# Patient Record
Sex: MALE | Race: WHITE | Employment: FULL TIME | URBAN - METROPOLITAN AREA
[De-identification: names, ages, dates, MRNs, and addresses within clinical notes are randomized per-mention and may not be internally consistent; named-entity substitution may affect disease eponyms.]

---

## 2024-02-07 ENCOUNTER — OFFICE VISIT (OUTPATIENT)
Dept: ORTHOPEDIC SURGERY | Age: 49
End: 2024-02-07
Payer: COMMERCIAL

## 2024-02-07 VITALS — HEIGHT: 65 IN | BODY MASS INDEX: 27.49 KG/M2 | WEIGHT: 165 LBS

## 2024-02-07 DIAGNOSIS — S89.91XA INJURY OF RIGHT KNEE, INITIAL ENCOUNTER: Primary | ICD-10-CM

## 2024-02-07 PROCEDURE — 99203 OFFICE O/P NEW LOW 30 MIN: CPT | Performed by: ORTHOPAEDIC SURGERY

## 2024-02-07 NOTE — PROGRESS NOTES
Name: Best Garcia  YOB: 1975  Gender: male  MRN: [id number]    CC: Right knee injury    HPI:   02/03/2024: Right knee injury going from snow [place] a grass spot on a trail injuring right knee  02/07/2024: Initial visit: Right knee injury    ROS/Meds/PSH/PMH/FH/SH: reviewed today    Tobacco:  reports that he has never smoked. He has never used smokeless tobacco.     Physical Examination:  Patient appears to be alert and oriented with acceptable appearance.  No obvious distress or SOB  CV: appears to have acceptable vascular color and capillary refill  Neuro: appears to have mostly intact light touch sensation   Skin: Right = soft knee effusion  MS: Standing: Plantigrade: Gait slightly protected right  Right = medial/lateral femoral condyle area pain suggestive of MCL/LCL origin  Right = mild medial/lateral anterior knee pain  Right = no gross instability; full motion; 5/5 strength    XR: Right knee: Standing AP lateral notch and sunrise views taken today with no acute pathology appreciated; potential slight lateral patella tilting  XR Impression:  As above      Reviewed Test/Records/Documents:   11/02/2004: Dr. Tinoco reflects MRI scan discoid meniscus  11/08/2004: Dr. Tinoco: Right knee arthroscopy lateral discoid meniscus resection with chondroplasty    Injection: No indication for aspiration or injection    Assessment:    Right knee injury, knee effusion    Plan:   The patient and I discussed the above assessment. We explored treatment options.     Due to his prior knee discoid meniscus surgery, I will MRI scan to ensure no meniscal pathology  He has a large soft knee effusion with suspected MCL/LCL injuries and femoral condyle bone contusions    Advanced medical imaging: Right knee MRI scan: Assess knee injury for suspected MCL/LCL injuries with femoral bone contusions: Prior lateral discoid meniscectomy: Assess for any tearing of both meniscus and cruciate ligaments    DME: [employer] hinged brace:

## 2024-02-12 ENCOUNTER — TELEPHONE (OUTPATIENT)
Dept: ORTHOPEDIC SURGERY | Age: 49
End: 2024-02-12

## 2024-02-12 DIAGNOSIS — S89.91XA INJURY OF RIGHT KNEE, INITIAL ENCOUNTER: ICD-10-CM

## 2024-02-12 NOTE — TELEPHONE ENCOUNTER
LVM WITH CONSUELO (SPOUSE) to have Braydon call the office and move up his appointment. The primary number listed for Best has been disconnected. No MyCHart available to message him.

## 2024-02-15 ENCOUNTER — OFFICE VISIT (OUTPATIENT)
Dept: ORTHOPEDIC SURGERY | Age: 49
End: 2024-02-15
Payer: COMMERCIAL

## 2024-02-15 VITALS — BODY MASS INDEX: 27.49 KG/M2 | WEIGHT: 165 LBS | HEIGHT: 65 IN

## 2024-02-15 DIAGNOSIS — S83.281A ACUTE LATERAL MENISCUS TEAR OF RIGHT KNEE, INITIAL ENCOUNTER: ICD-10-CM

## 2024-02-15 DIAGNOSIS — S83.511A RUPTURE OF ANTERIOR CRUCIATE LIGAMENT OF RIGHT KNEE, INITIAL ENCOUNTER: Primary | ICD-10-CM

## 2024-02-15 PROCEDURE — 99204 OFFICE O/P NEW MOD 45 MIN: CPT | Performed by: ORTHOPAEDIC SURGERY

## 2024-02-15 NOTE — PROGRESS NOTES
meniscus tear.  We had a long discussion about treatment options including operative and nonoperative options.  He wants to stay very active including snow skiing and cycling and he works as a  with a lot of twisting and turning.  He is elected for operative intervention which I think is very reasonable.  We discussed ACL surgery and potential ACL repair given the proximal tear versus reconstruction with autograft and allograft options.  He is elected proceed with an ACL repair versus allograft if indicated.  We discussed lateral meniscus repair versus debridement and the postoperative course associated with both of those.  He wants to try to return to work as soon as possible.    We will see him back for preoperative appointment surgical plan to be for right knee arthroscopy ACL repair versus reconstruction with allograft and lateral meniscus repair versus debridement              Kash Veronica MD, FAAOS  Orthopaedics and Sports Medicine

## 2024-02-16 DIAGNOSIS — S83.511A RUPTURE OF ANTERIOR CRUCIATE LIGAMENT OF RIGHT KNEE, INITIAL ENCOUNTER: Primary | ICD-10-CM

## 2024-02-16 DIAGNOSIS — S83.281A ACUTE LATERAL MENISCUS TEAR OF RIGHT KNEE, INITIAL ENCOUNTER: ICD-10-CM

## 2024-02-16 DIAGNOSIS — S89.91XA INJURY OF RIGHT KNEE, INITIAL ENCOUNTER: ICD-10-CM

## 2024-02-22 DIAGNOSIS — S89.91XA INJURY OF RIGHT KNEE, INITIAL ENCOUNTER: ICD-10-CM

## 2024-02-22 DIAGNOSIS — S83.511A RUPTURE OF ANTERIOR CRUCIATE LIGAMENT OF RIGHT KNEE, INITIAL ENCOUNTER: Primary | ICD-10-CM

## 2024-02-22 DIAGNOSIS — S89.91XA INJURY OF KNEE, RIGHT, INITIAL ENCOUNTER: ICD-10-CM

## 2024-02-22 DIAGNOSIS — S83.281A ACUTE LATERAL MENISCUS TEAR OF RIGHT KNEE, INITIAL ENCOUNTER: ICD-10-CM

## 2024-02-23 PROBLEM — S89.91XA: Status: ACTIVE | Noted: 2024-02-22

## 2024-02-23 PROBLEM — S83.281A ACUTE LATERAL MENISCUS TEAR OF RIGHT KNEE: Status: ACTIVE | Noted: 2024-02-22

## 2024-03-01 NOTE — PROGRESS NOTES
Name: Best Garcia  YOB: 1975  Gender: male  MRN: [id number]    CC: Knee pain (R)      HPI: Best Garcia is a 49 y.o. male who presents with right knee pain. He is here today for a pre-op evaluation, He was given a TROM and an iceman today. He will go to [facility] for rehab following surgery.    No current outpatient medications on file.  No Known Allergies  No past medical history on file.  No past surgical history on file.  No family history on file.  Social History     Socioeconomic History    Marital status:      Spouse name: Not on file    Number of children: Not on file    Years of education: Not on file    Highest education level: Not on file   Occupational History    Not on file   Tobacco Use    Smoking status: Never    Smokeless tobacco: Never   Substance and Sexual Activity    Alcohol use: Not on file    Drug use: Not on file    Sexual activity: Not on file   Other Topics Concern    Not on file   Social History Narrative    Not on file     Social Determinants of Health     Financial Resource Strain: Not on file   Food Insecurity: Not on file   Transportation Needs: Not on file   Physical Activity: Not on file   Stress: Not on file   Social Connections: Not on file   Intimate Partner Violence: Not on file   Housing Stability: Not on file        Physical Examination:  General: no acute distress  Lungs: breathing easily  CV: regular rhythm by pulse  HEENT: Head is normocephalic and atraumatic without tenderness, visible or palpable masses, depressions or scarring.  Visual acuity intact.  Nasal mucosa is pink and moist. Pinna, tragus and ear canal are nontender and without swelling  ABD: soft and nontender to palpation. Normal bowel sound  Right Knee:   No significant change from previous exam. No effusion noted today. Positive grade 2B Lachman's positive anterior drawer. Tenderness palpation over the lateral joint line passive extension about 3 degrees flexion 140

## 2024-03-01 NOTE — H&P (VIEW-ONLY)
Name: Best Garcia  YOB: 1975  Gender: male  MRN: [id number]    CC: Knee pain (R)      HPI: Best Garcia is a 49 y.o. male who presents with right knee pain. He is here today for a pre-op evaluation, He was given a TROM and an iceman today. He will go to [facility] for rehab following surgery.    No current outpatient medications on file.  No Known Allergies  No past medical history on file.  No past surgical history on file.  No family history on file.  Social History     Socioeconomic History    Marital status:      Spouse name: Not on file    Number of children: Not on file    Years of education: Not on file    Highest education level: Not on file   Occupational History    Not on file   Tobacco Use    Smoking status: Never    Smokeless tobacco: Never   Substance and Sexual Activity    Alcohol use: Not on file    Drug use: Not on file    Sexual activity: Not on file   Other Topics Concern    Not on file   Social History Narrative    Not on file     Social Determinants of Health     Financial Resource Strain: Not on file   Food Insecurity: Not on file   Transportation Needs: Not on file   Physical Activity: Not on file   Stress: Not on file   Social Connections: Not on file   Intimate Partner Violence: Not on file   Housing Stability: Not on file        Physical Examination:  General: no acute distress  Lungs: breathing easily  CV: regular rhythm by pulse  HEENT: Head is normocephalic and atraumatic without tenderness, visible or palpable masses, depressions or scarring.  Visual acuity intact.  Nasal mucosa is pink and moist. Pinna, tragus and ear canal are nontender and without swelling  ABD: soft and nontender to palpation. Normal bowel sound  Right Knee:   No significant change from previous exam. No effusion noted today. Positive grade 2B Lachman's positive anterior drawer. Tenderness palpation over the lateral joint line passive extension about 3 degrees flexion 140

## 2024-03-04 ENCOUNTER — OFFICE VISIT (OUTPATIENT)
Dept: ORTHOPEDIC SURGERY | Age: 49
End: 2024-03-04

## 2024-03-04 DIAGNOSIS — S89.91XA INJURY OF RIGHT KNEE, INITIAL ENCOUNTER: ICD-10-CM

## 2024-03-04 DIAGNOSIS — S83.281A ACUTE LATERAL MENISCUS TEAR OF RIGHT KNEE, INITIAL ENCOUNTER: ICD-10-CM

## 2024-03-04 DIAGNOSIS — S83.511A RUPTURE OF ANTERIOR CRUCIATE LIGAMENT OF RIGHT KNEE, INITIAL ENCOUNTER: Primary | ICD-10-CM

## 2024-03-04 RX ORDER — OXYCODONE HYDROCHLORIDE 5 MG/1
5 TABLET ORAL EVERY 6 HOURS PRN
Qty: 30 TABLET | Refills: 0 | Status: SHIPPED | OUTPATIENT
Start: 2024-03-04 | End: 2024-03-09

## 2024-03-05 NOTE — PROGRESS NOTES
The brace was properly aligned medially and laterally along the length of the right Knee with the extension/flexion dials placed slightly above the patella (to insure that if brace slides down slightly the dials will still line up on either side of the patella/knee region). The brace is extended/shorten to the patient's leg length and to insure proper fit of the brace. The straps are tightened starting with the most distal strap and then strap proximal to the patella. The strap right below the patella is then secured and last is the strap highest on the quad. The straps are then trimmed for easier tightening of the brace for the patient.     Patient read and signed documenting they understand and agree to [facility]'s current DME return policy.

## 2024-03-07 NOTE — PERIOP NOTE
Patient verified name and .  Order for consent not found in EHR; patient verifies procedure.     Type 1B surgery, Phone assessment complete.  Orders not received.  Labs per surgeon: none  Labs per anesthesia protocol: none    Patient answered medical/surgical history questions at their best of ability. All prior to admission medications documented in EPIC.    Patient instructed to continue taking all prescription medications up to the day of surgery but to take only the following medications the day of surgery according to anesthesia guidelines with a small sip of water: Oxycodone as instructed if needed.   Patient informed that all vitamins and supplements should be held 7 days prior to surgery and NSAIDS 5 days prior to surgery. Prescription meds to hold:Vitamin C, Vitamin B12, Magnesium, Turmeric, and Vitamin D.    Patient instructed on the following:    > Arrive at OPC Entrance, time of arrival to be called the day before by 1700  > NPO after midnight, unless otherwise indicated, including gum, mints, and ice chips  > Responsible adult must drive patient to the hospital, stay during surgery, and patient will need supervision 24 hours after anesthesia  > Use non moisturizing soap in shower the night before surgery and on the morning of surgery  > All piercings must be removed prior to arrival.    > Leave all valuables (money and jewelry) at home but bring insurance card and ID on DOS.   > You may be required to pay a deductible or co-pay on the day of your procedure. You can pre-pay by calling [phone] if your surgery is at the [facility] or [phone] if your surgery is at the [facility].  > Do not wear make-up, nail polish, lotions, cologne, perfumes, powders, or oil on skin. Artificial nails are not permitted.

## 2024-03-12 NOTE — PERIOP NOTE
Preop department called to notify patient of arrival time for scheduled procedure. Instructions given to   - Arrive at OPC Entrance [address].  - Remain NPO after midnight, unless otherwise indicated, including gum, mints, and ice chips.   - Have a responsible adult to drive patient to the hospital, stay during surgery, and patient will need supervision 24 hours after anesthesia.   - Use antibacterial soap in shower the night before surgery and on the morning of surgery.       Was patient contacted: yes, pt  Voicemail left: n/a  Numbers contacted: [phone]   Arrival time: 0800

## 2024-03-13 ENCOUNTER — ANESTHESIA (OUTPATIENT)
Dept: SURGERY | Age: 49
End: 2024-03-13
Payer: COMMERCIAL

## 2024-03-13 ENCOUNTER — ANESTHESIA EVENT (OUTPATIENT)
Dept: SURGERY | Age: 49
End: 2024-03-13
Payer: COMMERCIAL

## 2024-03-13 ENCOUNTER — HOSPITAL ENCOUNTER (OUTPATIENT)
Age: 49
Setting detail: OUTPATIENT SURGERY
Discharge: HOME | End: 2024-03-13
Attending: ORTHOPAEDIC SURGERY | Admitting: ORTHOPAEDIC SURGERY
Payer: COMMERCIAL

## 2024-03-13 VITALS
BODY MASS INDEX: 27.49 KG/M2 | WEIGHT: 165 LBS | DIASTOLIC BLOOD PRESSURE: 56 MMHG | HEIGHT: 65 IN | RESPIRATION RATE: 20 BRPM | OXYGEN SATURATION: 100 % | HEART RATE: 92 BPM | TEMPERATURE: 98 F | SYSTOLIC BLOOD PRESSURE: 128 MMHG

## 2024-03-13 DIAGNOSIS — S83.511A RUPTURE OF ANTERIOR CRUCIATE LIGAMENT OF RIGHT KNEE, INITIAL ENCOUNTER: Primary | ICD-10-CM

## 2024-03-13 PROCEDURE — 2500000003 HC RX 250 WO HCPCS: Performed by: NURSE ANESTHETIST, CERTIFIED REGISTERED

## 2024-03-13 PROCEDURE — 7100000010 HC PHASE II RECOVERY - FIRST 15 MIN: Performed by: ORTHOPAEDIC SURGERY

## 2024-03-13 PROCEDURE — 7100000011 HC PHASE II RECOVERY - ADDTL 15 MIN: Performed by: ORTHOPAEDIC SURGERY

## 2024-03-13 PROCEDURE — 6370000000 HC RX 637 (ALT 250 FOR IP): Performed by: ANESTHESIOLOGY

## 2024-03-13 PROCEDURE — 7100000001 HC PACU RECOVERY - ADDTL 15 MIN: Performed by: ORTHOPAEDIC SURGERY

## 2024-03-13 PROCEDURE — 2720000010 HC SURG SUPPLY STERILE: Performed by: ORTHOPAEDIC SURGERY

## 2024-03-13 PROCEDURE — A4216 STERILE WATER/SALINE, 10 ML: HCPCS | Performed by: ORTHOPAEDIC SURGERY

## 2024-03-13 PROCEDURE — 7100000000 HC PACU RECOVERY - FIRST 15 MIN: Performed by: ORTHOPAEDIC SURGERY

## 2024-03-13 PROCEDURE — 2580000003 HC RX 258: Performed by: ANESTHESIOLOGY

## 2024-03-13 PROCEDURE — 3700000000 HC ANESTHESIA ATTENDED CARE: Performed by: ORTHOPAEDIC SURGERY

## 2024-03-13 PROCEDURE — 6360000002 HC RX W HCPCS: Performed by: NURSE ANESTHETIST, CERTIFIED REGISTERED

## 2024-03-13 PROCEDURE — C1713 ANCHOR/SCREW BN/BN,TIS/BN: HCPCS | Performed by: ORTHOPAEDIC SURGERY

## 2024-03-13 PROCEDURE — 3700000001 HC ADD 15 MINUTES (ANESTHESIA): Performed by: ORTHOPAEDIC SURGERY

## 2024-03-13 PROCEDURE — 6360000002 HC RX W HCPCS: Performed by: PHYSICIAN ASSISTANT

## 2024-03-13 PROCEDURE — 3600000014 HC SURGERY LEVEL 4 ADDTL 15MIN: Performed by: ORTHOPAEDIC SURGERY

## 2024-03-13 PROCEDURE — 6360000002 HC RX W HCPCS: Performed by: ORTHOPAEDIC SURGERY

## 2024-03-13 PROCEDURE — 2580000003 HC RX 258: Performed by: ORTHOPAEDIC SURGERY

## 2024-03-13 PROCEDURE — 2709999900 HC NON-CHARGEABLE SUPPLY: Performed by: ORTHOPAEDIC SURGERY

## 2024-03-13 PROCEDURE — 3600000004 HC SURGERY LEVEL 4 BASE: Performed by: ORTHOPAEDIC SURGERY

## 2024-03-13 DEVICE — DEVICE GRFT FIX 4.75X19.1 MM BIOCOMPOSITE SWIVELOCK: Type: IMPLANTABLE DEVICE | Site: KNEE | Status: FUNCTIONAL

## 2024-03-13 DEVICE — GRAFT HUM TISS L60-80MM DIA7.5-10.5MM FRZN FLEXIGRFT: Type: IMPLANTABLE DEVICE | Site: KNEE | Status: FUNCTIONAL

## 2024-03-13 DEVICE — SYSTEM SFT TISS FIX STRL LF DISP GRAFTLINK CP 2: Type: IMPLANTABLE DEVICE | Status: FUNCTIONAL

## 2024-03-13 RX ORDER — HYDROMORPHONE HYDROCHLORIDE 2 MG/ML
0.5 INJECTION, SOLUTION INTRAMUSCULAR; INTRAVENOUS; SUBCUTANEOUS EVERY 10 MIN PRN
Status: DISCONTINUED | OUTPATIENT
Start: 2024-03-13 | End: 2024-03-13 | Stop reason: HOSPADM

## 2024-03-13 RX ORDER — SODIUM CHLORIDE 9 MG/ML
INJECTION, SOLUTION INTRAVENOUS CONTINUOUS
Status: DISCONTINUED | OUTPATIENT
Start: 2024-03-13 | End: 2024-03-13 | Stop reason: HOSPADM

## 2024-03-13 RX ORDER — IBUPROFEN 600 MG/1
1 TABLET ORAL PRN
Status: DISCONTINUED | OUTPATIENT
Start: 2024-03-13 | End: 2024-03-13 | Stop reason: HOSPADM

## 2024-03-13 RX ORDER — OXYCODONE HYDROCHLORIDE 5 MG/1
5 TABLET ORAL
Status: DISCONTINUED | OUTPATIENT
Start: 2024-03-13 | End: 2024-03-13 | Stop reason: HOSPADM

## 2024-03-13 RX ORDER — ROPIVACAINE HYDROCHLORIDE 5 MG/ML
INJECTION, SOLUTION EPIDURAL; INFILTRATION; PERINEURAL PRN
Status: DISCONTINUED | OUTPATIENT
Start: 2024-03-13 | End: 2024-03-13 | Stop reason: HOSPADM

## 2024-03-13 RX ORDER — SODIUM CHLORIDE 0.9 % (FLUSH) 0.9 %
5-40 SYRINGE (ML) INJECTION EVERY 12 HOURS SCHEDULED
Status: DISCONTINUED | OUTPATIENT
Start: 2024-03-13 | End: 2024-03-13 | Stop reason: HOSPADM

## 2024-03-13 RX ORDER — PROPOFOL 10 MG/ML
INJECTION, EMULSION INTRAVENOUS PRN
Status: DISCONTINUED | OUTPATIENT
Start: 2024-03-13 | End: 2024-03-13 | Stop reason: SDUPTHER

## 2024-03-13 RX ORDER — DEXAMETHASONE SODIUM PHOSPHATE 10 MG/ML
INJECTION INTRAMUSCULAR; INTRAVENOUS PRN
Status: DISCONTINUED | OUTPATIENT
Start: 2024-03-13 | End: 2024-03-13 | Stop reason: SDUPTHER

## 2024-03-13 RX ORDER — SODIUM CHLORIDE 9 MG/ML
INJECTION, SOLUTION INTRAVENOUS PRN
Status: DISCONTINUED | OUTPATIENT
Start: 2024-03-13 | End: 2024-03-13 | Stop reason: HOSPADM

## 2024-03-13 RX ORDER — DEXTROSE MONOHYDRATE 100 MG/ML
INJECTION, SOLUTION INTRAVENOUS CONTINUOUS PRN
Status: DISCONTINUED | OUTPATIENT
Start: 2024-03-13 | End: 2024-03-13 | Stop reason: HOSPADM

## 2024-03-13 RX ORDER — NALOXONE HYDROCHLORIDE 0.4 MG/ML
INJECTION, SOLUTION INTRAMUSCULAR; INTRAVENOUS; SUBCUTANEOUS PRN
Status: DISCONTINUED | OUTPATIENT
Start: 2024-03-13 | End: 2024-03-13 | Stop reason: HOSPADM

## 2024-03-13 RX ORDER — EPHEDRINE SULFATE 5 MG/ML
INJECTION INTRAVENOUS PRN
Status: DISCONTINUED | OUTPATIENT
Start: 2024-03-13 | End: 2024-03-13 | Stop reason: SDUPTHER

## 2024-03-13 RX ORDER — ACETAMINOPHEN 500 MG
1000 TABLET ORAL ONCE
Status: COMPLETED | OUTPATIENT
Start: 2024-03-13 | End: 2024-03-13

## 2024-03-13 RX ORDER — EPINEPHRINE 1 MG/ML(1)
AMPUL (ML) INJECTION PRN
Status: DISCONTINUED | OUTPATIENT
Start: 2024-03-13 | End: 2024-03-13 | Stop reason: HOSPADM

## 2024-03-13 RX ORDER — LIDOCAINE HYDROCHLORIDE 10 MG/ML
1 INJECTION, SOLUTION INFILTRATION; PERINEURAL
Status: DISCONTINUED | OUTPATIENT
Start: 2024-03-13 | End: 2024-03-13 | Stop reason: HOSPADM

## 2024-03-13 RX ORDER — DEXMEDETOMIDINE HYDROCHLORIDE 100 UG/ML
INJECTION, SOLUTION INTRAVENOUS PRN
Status: DISCONTINUED | OUTPATIENT
Start: 2024-03-13 | End: 2024-03-13 | Stop reason: SDUPTHER

## 2024-03-13 RX ORDER — SODIUM CHLORIDE 9 MG/ML
INJECTION, SOLUTION INTRAMUSCULAR; INTRAVENOUS; SUBCUTANEOUS PRN
Status: DISCONTINUED | OUTPATIENT
Start: 2024-03-13 | End: 2024-03-13 | Stop reason: HOSPADM

## 2024-03-13 RX ORDER — FENTANYL CITRATE 50 UG/ML
100 INJECTION, SOLUTION INTRAMUSCULAR; INTRAVENOUS
Status: DISCONTINUED | OUTPATIENT
Start: 2024-03-13 | End: 2024-03-13 | Stop reason: HOSPADM

## 2024-03-13 RX ORDER — MIDAZOLAM HYDROCHLORIDE 2 MG/2ML
2 INJECTION, SOLUTION INTRAMUSCULAR; INTRAVENOUS
Status: DISCONTINUED | OUTPATIENT
Start: 2024-03-13 | End: 2024-03-13 | Stop reason: HOSPADM

## 2024-03-13 RX ORDER — HYDROMORPHONE HYDROCHLORIDE 2 MG/ML
INJECTION, SOLUTION INTRAMUSCULAR; INTRAVENOUS; SUBCUTANEOUS PRN
Status: DISCONTINUED | OUTPATIENT
Start: 2024-03-13 | End: 2024-03-13 | Stop reason: SDUPTHER

## 2024-03-13 RX ORDER — KETAMINE HYDROCHLORIDE 50 MG/ML
INJECTION, SOLUTION INTRAMUSCULAR; INTRAVENOUS PRN
Status: DISCONTINUED | OUTPATIENT
Start: 2024-03-13 | End: 2024-03-13 | Stop reason: SDUPTHER

## 2024-03-13 RX ORDER — SODIUM CHLORIDE 0.9 % (FLUSH) 0.9 %
5-40 SYRINGE (ML) INJECTION PRN
Status: DISCONTINUED | OUTPATIENT
Start: 2024-03-13 | End: 2024-03-13 | Stop reason: HOSPADM

## 2024-03-13 RX ORDER — SODIUM CHLORIDE, SODIUM LACTATE, POTASSIUM CHLORIDE, CALCIUM CHLORIDE 600; 310; 30; 20 MG/100ML; MG/100ML; MG/100ML; MG/100ML
INJECTION, SOLUTION INTRAVENOUS CONTINUOUS
Status: DISCONTINUED | OUTPATIENT
Start: 2024-03-13 | End: 2024-03-13 | Stop reason: HOSPADM

## 2024-03-13 RX ORDER — PROCHLORPERAZINE EDISYLATE 5 MG/ML
5 INJECTION INTRAMUSCULAR; INTRAVENOUS
Status: DISCONTINUED | OUTPATIENT
Start: 2024-03-13 | End: 2024-03-13 | Stop reason: HOSPADM

## 2024-03-13 RX ORDER — OXYCODONE HYDROCHLORIDE 5 MG/1
5 TABLET ORAL EVERY 4 HOURS PRN
Qty: 30 TABLET | Refills: 0 | Status: SHIPPED | OUTPATIENT
Start: 2024-03-13 | End: 2024-03-20

## 2024-03-13 RX ORDER — LIDOCAINE HYDROCHLORIDE 20 MG/ML
INJECTION, SOLUTION EPIDURAL; INFILTRATION; INTRACAUDAL; PERINEURAL PRN
Status: DISCONTINUED | OUTPATIENT
Start: 2024-03-13 | End: 2024-03-13 | Stop reason: SDUPTHER

## 2024-03-13 RX ORDER — KETOROLAC TROMETHAMINE 30 MG/ML
INJECTION, SOLUTION INTRAMUSCULAR; INTRAVENOUS PRN
Status: DISCONTINUED | OUTPATIENT
Start: 2024-03-13 | End: 2024-03-13 | Stop reason: HOSPADM

## 2024-03-13 RX ORDER — DIPHENHYDRAMINE HYDROCHLORIDE 50 MG/ML
12.5 INJECTION INTRAMUSCULAR; INTRAVENOUS
Status: DISCONTINUED | OUTPATIENT
Start: 2024-03-13 | End: 2024-03-13 | Stop reason: HOSPADM

## 2024-03-13 RX ORDER — ONDANSETRON 2 MG/ML
INJECTION INTRAMUSCULAR; INTRAVENOUS PRN
Status: DISCONTINUED | OUTPATIENT
Start: 2024-03-13 | End: 2024-03-13 | Stop reason: SDUPTHER

## 2024-03-13 RX ADMIN — DEXMEDETOMIDINE 8 MCG: 100 INJECTION, SOLUTION, CONCENTRATE INTRAVENOUS at 11:53

## 2024-03-13 RX ADMIN — EPHEDRINE SULFATE 10 MG: 5 INJECTION INTRAVENOUS at 12:16

## 2024-03-13 RX ADMIN — SODIUM CHLORIDE, SODIUM LACTATE, POTASSIUM CHLORIDE, AND CALCIUM CHLORIDE: 600; 310; 30; 20 INJECTION, SOLUTION INTRAVENOUS at 12:53

## 2024-03-13 RX ADMIN — Medication 2000 MG: at 11:51

## 2024-03-13 RX ADMIN — HYDROMORPHONE HYDROCHLORIDE 0.2 MG: 2 INJECTION INTRAMUSCULAR; INTRAVENOUS; SUBCUTANEOUS at 12:26

## 2024-03-13 RX ADMIN — HYDROMORPHONE HYDROCHLORIDE 0.2 MG: 2 INJECTION INTRAMUSCULAR; INTRAVENOUS; SUBCUTANEOUS at 12:41

## 2024-03-13 RX ADMIN — SODIUM CHLORIDE, SODIUM LACTATE, POTASSIUM CHLORIDE, AND CALCIUM CHLORIDE: 600; 310; 30; 20 INJECTION, SOLUTION INTRAVENOUS at 08:46

## 2024-03-13 RX ADMIN — DEXAMETHASONE SODIUM PHOSPHATE 10 MG: 10 INJECTION INTRAMUSCULAR; INTRAVENOUS at 11:54

## 2024-03-13 RX ADMIN — PROPOFOL 50 MG: 10 INJECTION, EMULSION INTRAVENOUS at 11:43

## 2024-03-13 RX ADMIN — HYDROMORPHONE HYDROCHLORIDE 0.2 MG: 2 INJECTION INTRAMUSCULAR; INTRAVENOUS; SUBCUTANEOUS at 12:12

## 2024-03-13 RX ADMIN — ONDANSETRON 4 MG: 2 INJECTION INTRAMUSCULAR; INTRAVENOUS at 11:54

## 2024-03-13 RX ADMIN — KETAMINE HYDROCHLORIDE 10 MG: 50 INJECTION, SOLUTION INTRAMUSCULAR; INTRAVENOUS at 11:50

## 2024-03-13 RX ADMIN — ACETAMINOPHEN 1000 MG: 500 TABLET, FILM COATED ORAL at 08:46

## 2024-03-13 RX ADMIN — LIDOCAINE HYDROCHLORIDE 100 MG: 20 INJECTION, SOLUTION EPIDURAL; INFILTRATION; INTRACAUDAL; PERINEURAL at 11:42

## 2024-03-13 RX ADMIN — HYDROMORPHONE HYDROCHLORIDE 0.2 MG: 2 INJECTION INTRAMUSCULAR; INTRAVENOUS; SUBCUTANEOUS at 11:50

## 2024-03-13 RX ADMIN — KETAMINE HYDROCHLORIDE 10 MG: 50 INJECTION, SOLUTION INTRAMUSCULAR; INTRAVENOUS at 12:10

## 2024-03-13 RX ADMIN — Medication 1000 MG: at 12:51

## 2024-03-13 RX ADMIN — HYDROMORPHONE HYDROCHLORIDE 0.2 MG: 2 INJECTION INTRAMUSCULAR; INTRAVENOUS; SUBCUTANEOUS at 12:10

## 2024-03-13 RX ADMIN — PROPOFOL 250 MG: 10 INJECTION, EMULSION INTRAVENOUS at 11:42

## 2024-03-13 ASSESSMENT — PAIN DESCRIPTION - DESCRIPTORS
DESCRIPTORS: ACHING
DESCRIPTORS: ACHING

## 2024-03-13 ASSESSMENT — PAIN - FUNCTIONAL ASSESSMENT: PAIN_FUNCTIONAL_ASSESSMENT: 0-10

## 2024-03-13 ASSESSMENT — PAIN DESCRIPTION - ORIENTATION
ORIENTATION: RIGHT
ORIENTATION: RIGHT

## 2024-03-13 ASSESSMENT — PAIN DESCRIPTION - LOCATION
LOCATION: KNEE
LOCATION: KNEE

## 2024-03-13 ASSESSMENT — PAIN SCALES - GENERAL
PAINLEVEL_OUTOF10: 4
PAINLEVEL_OUTOF10: 4

## 2024-03-13 NOTE — ANESTHESIA PROCEDURE NOTES
Airway  Date/Time: 3/13/2024 11:44 AM  Urgency: elective    Airway not difficult    General Information and Staff    Patient location during procedure: OR  Resident/CRNA: Dottie Nowak APRN - CRNA  Performed: resident/CRNA   Performed by: Dottie Nowak APRN - CRNA  Authorized by: Dottie Nowak APRN - CRNA      Indications and Patient Condition  Indications for airway management: anesthesia  Spontaneous ventilation: present  Sedation level: deep  Preoxygenated: yes  Patient position: sniffing  MILS not maintained throughout  Mask difficulty assessment: vent by bag mask    Final Airway Details  Final airway type: supraglottic airway      Successful airway: oropharyngeal  Size 4     Number of attempts at approach: 1  Ventilation between attempts: supraglottic airway  Number of other approaches attempted: 0    no

## 2024-03-13 NOTE — PERIOP NOTE
Oral airway removed. Pt awake and lethargic. Oriented to person, place, generally to time. Pt able to state month and year.

## 2024-03-13 NOTE — ANESTHESIA POSTPROCEDURE EVALUATION
Department of Anesthesiology  Postprocedure Note    Patient: Best Garcia  MRN: [id number]  YOB: 1975  Date of evaluation: 3/13/2024    Procedure Summary       Date: 03/13/24 Room / Location: [facility] OP OR 03 / [facility]    Anesthesia Start: 1135 Anesthesia Stop: 1329    Procedure: RIGHT KNEE ARTHROSCOPY ACL RECONSTRUCTION GRAFTLINK ALLOGRAFT AND PARTIAL LATERAL MENISECTOMY (Right: Knee) Diagnosis:       Rupture of anterior cruciate ligament of right knee, initial encounter      Acute lateral meniscus tear of right knee, initial encounter      Injury of knee, right, initial encounter      (Rupture of anterior cruciate ligament of right knee, initial encounter [S83.511A])      (Acute lateral meniscus tear of right knee, initial encounter [S83.281A])      (Injury of knee, right, initial encounter [S89.91XA])    Surgeons: Kash Veronica MD Responsible Provider: Colton Hernandez MD    Anesthesia Type: general ASA Status: 1            Anesthesia Type: No value filed.    Rene Phase I: Rene Score: 10    Rene Phase II: Rene Score: 10    Anesthesia Post Evaluation    Patient location during evaluation: PACU  Patient participation: complete - patient participated  Level of consciousness: awake and alert  Airway patency: patent  Nausea: well controlled.  Cardiovascular status: acceptable.  Respiratory status: acceptable  Hydration status: stable  Pain management: adequate    No notable events documented.

## 2024-03-13 NOTE — OP NOTE
Operative Report    Patient: Best Garcia MRN: [id number]  SSN: [id number]    YOB: 1975  Age: 49 y.o.  Sex: male       Date of Surgery: 3/13/2024     Preoperative Diagnosis: 1) right ACL tear  2) right Knee lateral Meniscus tear    Postoperative Diagnosis: Same  3) right knee medial femoral condyle chondromalacia    Surgeon(s) and Role:     * Kash Veronica MD - Primary      Assistant: Alejandra Govea PA-C  The complexity of the surgery requires the use of a first assistant for patient positioning, graft prep, graft fixation, tunnel drilling and assistance in closure.  ZORAIDA Juarez was this assistant and was there for the entirety of the case.     Anesthesia: General     Procedure:    1) right knee arthroscopically assisted ACL reconstruction with soft tissue allograft   2) right  Knee arthroscopy with partial lateral meniscectomy       Estimated Blood Loss:  5 mL    Tourniquet Time:   Total Tourniquet Time Documented:  Thigh (Right) - 50 minutes  Total: Thigh (Right) - 50 minutes        Implants:   Arthrex all inside Graft link with internal brace augmentation             Specimens: * No specimens in log *        Drains: None                Complications: None    Counts: Sponge and needle counts were correct times two.    Indications: See H&P      Procedure in Detail: After informed consent was obtained the surgical site was marked in the preoperative area by myself the surgeon.  Patient was brought to the operating room placed supine the operating table general anesthesia was induced.  Examination under anesthesia revealed  positive grade 2B Lachman's and a positive pivot shift.  No instability to varus or valgus stress at 0 and 30 degrees.  The operative extremity was prepped and draped in usual orthopedic sterile fashion timeout was performed per protocol.  Antibiotics were given per protocol.    A presutured Graft link soft tissue allograft was selected and thawed. It was

## 2024-03-13 NOTE — PROGRESS NOTES
Spiritual Consult for Pre-Surgery Prayer. PT was in bed preparing for surgery.  Spouse was at bedside.  PT expressed importance of butch and prayer. PT is Rastafari. PT mention [employer]. [place]ksand[place], the patron saint of those with knee problems. CH offered prayer. CH offered additional support if needed.    Rev. Janie Lucas M.Div.

## 2024-03-13 NOTE — DISCHARGE INSTRUCTIONS
Post-op instructions for ACL Reconstruction / Meniscus Repair / Patellar Stabilization (Dr. Veronica)    Day of Surgery:     DIET:   Begin with liquids and light foods (jell-o, soup, etc.). Progress to your normal diet if you are not nauseated.    MEDICATION:   1. Pain- Norco (hydrocodone/acetaminophen) or Oxycodone. If you are taking oxycodone, you may also take two (2) Tylenol (acetaminophen) 500mg tabs every eight (8) hours. Do not take Tylenol (acetaminophen) if you are given Norco as this medicine already contains acetaminophen. Do not drink alcohol while taking pain medication. Slowly wean off the pain medication as the pain improves.   2. Aspirin 81mg-Take one (1) tablet in the morning and at night each day x 2 weeks post-operatively. Begin the night of surgery.   3. Stool Softener-Pain medication can cause constipation. Be sure to drink plenty of water and take an over the counter stool softener as needed.     ICE:  Do NOT put the ice machine directly on your skin. Use it frequently for the first 72 hours. You may also use a regular ice bag/pack for 20 minutes at a time. Place a thin layer of clothing or pillow case between ice pack and your skin. Ice for 20-30 minutes on and then 20-30 minutes off.   If you are awake and comfortable or you have the surgical dressing still intact it is ok to use the ice machine more than 30 minutes at a time as long as you ensure it is not burning your skin.       SHOWERING:  No showering. Leave bandages in place.     ACTIVITY:  Keep leg elevated on a pillow placed under your ankle.   **DO NOT PUT A PILLOW UNDER YOUR KNEE!!**  It is important for you to keep your leg straight. Use crutches and put no weight on the operative leg. If you were given a brace, keep it on.    EXERCISES:  Begin ankle pumps.   Attempt quadriceps sets and straight leg raises (with brace on).        First & Second Post-Op Day:    MEDICATION: Continue as instructed as above.    BANDAGES: No

## 2024-03-13 NOTE — INTERVAL H&P NOTE
Update History & Physical    The Patient's History and Physical was reviewed   I discussed the surgery and patients medical condition with the patient.  The chart was reviewed with the patient and I examined the patient.    There was no change from previous note.  The surgical site was confirmed by the patient and me.    CV: RRR  RESP: CTAB    Plan:  The risk, benefits, expected outcome, and alternative to the recommended procedure have been discussed with the patient.  Patient understands and elects to proceed with the procedure as planned.    Electronically signed by Kash Veronica MD on 03/13/24 11:44 AM

## 2024-03-13 NOTE — ANESTHESIA PRE PROCEDURE
Colton Hernandez MD       • sodium chloride flush 0.9 % injection 5-40 mL  5-40 mL IntraVENous PRN Colton Hernandez MD       • 0.9 % sodium chloride infusion   IntraVENous PRN Colton Hernandez MD       • midazolam PF (VERSED) injection 2 mg  2 mg IntraVENous Once PRN Colton Hernandez MD           Allergies:  No Known Allergies    Problem List:    Patient Active Problem List   Diagnosis Code   • Rupture of anterior cruciate ligament of right knee S83.511A   • Acute lateral meniscus tear of right knee S83.281A   • Injury of knee, right, initial encounter S89.91XA       Past Medical History:        Diagnosis Date   • Rupture of anterior cruciate ligament of right knee, initial encounter     surgery scheduled on 3.13.24       Past Surgical History:        Procedure Laterality Date   • CHOLECYSTECTOMY, LAPAROSCOPIC         Social History:    Social History     Tobacco Use   • Smoking status: Never   • Smokeless tobacco: Never   Substance Use Topics   • Alcohol use: Yes     Comment: occasionally                                Counseling given: Not Answered      Vital Signs (Current):   Vitals:    03/07/24 0953 03/13/24 0815 03/13/24 0847   BP:  127/81    Pulse:  75    Resp:  18    Temp:  98.3 °F (36.8 °C)    TempSrc:  Oral    SpO2:  98%    Weight: 74.8 kg (165 lb) 74.8 kg (165 lb) 74.8 kg (165 lb)   Height: 1.651 m (5' 5\")  1.651 m (5' 5\")                                              BP Readings from Last 3 Encounters:   03/13/24 127/81       NPO Status: Time of last liquid consumption: 2330                        Time of last solid consumption: 1830                        Date of last liquid consumption: 03/12/24                        Date of last solid food consumption: 03/12/24    BMI:   Wt Readings from Last 3 Encounters:   03/13/24 74.8 kg (165 lb)   02/15/24 74.8 kg (165 lb)   02/07/24 74.8 kg (165 lb)     Body mass index is 27.46 kg/m².    CBC: No results found for: \"WBC\", \"RBC\", \"HGB\", \"HCT\", \"MCV\", \"RDW\", \"PLT\"    CMP:

## 2024-03-15 ENCOUNTER — HOSPITAL ENCOUNTER (OUTPATIENT)
Dept: PHYSICAL THERAPY | Age: 49
Setting detail: RECURRING SERIES
Discharge: HOME | End: 2024-03-18
Payer: COMMERCIAL

## 2024-03-15 DIAGNOSIS — M25.561 RIGHT KNEE PAIN, UNSPECIFIED CHRONICITY: Primary | ICD-10-CM

## 2024-03-15 DIAGNOSIS — R26.2 DIFFICULTY IN WALKING, NOT ELSEWHERE CLASSIFIED: ICD-10-CM

## 2024-03-15 PROCEDURE — 97110 THERAPEUTIC EXERCISES: CPT

## 2024-03-15 PROCEDURE — 97161 PT EVAL LOW COMPLEX 20 MIN: CPT

## 2024-03-15 ASSESSMENT — PAIN DESCRIPTION - LOCATION: LOCATION: KNEE

## 2024-03-15 ASSESSMENT — PAIN SCALES - GENERAL: PAINLEVEL_OUTOF10: 2

## 2024-03-15 ASSESSMENT — PAIN DESCRIPTION - ORIENTATION: ORIENTATION: RIGHT

## 2024-03-15 ASSESSMENT — PAIN DESCRIPTION - DESCRIPTORS: DESCRIPTORS: ACHING

## 2024-03-15 NOTE — PROGRESS NOTES
Best Garcia  : 1975  Primary: [employer]  Secondary:  [facility] @ [address]  [place] [address]  Phone: [phone]  Fax: [phone] Plan Frequency: Two times a week for 90 days    Plan of Care/Certification Expiration Date: 24        Plan of Care/Certification Expiration Date:  Plan of Care/Certification Expiration Date: 24    Frequency/Duration:   Plan Frequency: Two times a week for 90 days      Time In/Out:   Time In: 1015  Time Out: 1100      PT Visit Info:    Progress Note Due Date: 24  Total # of Visits to Date: 1  Progress Note Counter: 1      Visit Count:  1    OUTPATIENT PHYSICAL THERAPY:   Treatment Note 3/15/2024       Episode  (PT-Right ACL repair)               Treatment Diagnosis:    Right knee pain, unspecified chronicity  Difficulty in walking, not elsewhere classified  Medical/Referring Diagnosis:    Rupture of anterior cruciate ligament of right knee, initial encounter  Acute lateral meniscus tear of right knee, initial encounter  Injury of right knee, initial encounter  Referring Physician:  Alejandra Govea PA MD Orders:  PT Eval and Treat   Return MD Appt:  2024   Date of Onset:  Onset Date: 24 (Surgery)     Allergies:   Patient has no known allergies.  Restrictions/Precautions:   Post Surgical Precautions: Per Dr. Veronica's protocols      Interventions Planned (Treatment may consist of any combination of the following):     See Assessment Note    Subjective Comments:   Patient reports he is doing okay.  Initial Pain Level:: Right Knee 2/10  Post Session Pain Level:  Right  Knee 2/10  Medications Last Reviewed:  3/15/2024  Updated Objective Findings:  See Evaluation Note from today  Treatment   THERAPEUTIC EXERCISE: (25 minutes):    Exercises per grid below to improve mobility and strength.  Required minimal verbal cues to promote proper body alignment.  Progressed

## 2024-03-15 NOTE — THERAPY EVALUATION
Best Garcia  : 1975  Primary: [employer] [place] [employer] [place])  Secondary:  [facility] @ [address]  [place] [address]  Phone: [phone]  Fax: [phone] Plan Frequency: Two times a week for 90 days  Plan of Care/Certification Expiration Date: 24        Plan of Care/Certification Expiration Date:  Plan of Care/Certification Expiration Date: 24    Frequency/Duration: Plan Frequency: Two times a week for 90 days      Time In/Out:   Time In: 1015  Time Out: 1100      PT Visit Info:    Progress Note Due Date: 24  Total # of Visits to Date: 1  Progress Note Counter: 1      Visit Count:  1                OUTPATIENT PHYSICAL THERAPY:             Initial Assessment 3/15/2024               Episode (PT-Right ACL repair)         Treatment Diagnosis:     Right knee pain, unspecified chronicity  Difficulty in walking, not elsewhere classified  Medical/Referring Diagnosis:    Rupture of anterior cruciate ligament of right knee, initial encounter  Acute lateral meniscus tear of right knee, initial encounter  Injury of right knee, initial encounter  Referring Physician:  Alejandra Govea PA/Kash Veronica MD MD Orders:  PT Eval and Treat   Return MD Appt:  2024  Date of Onset:  Onset Date: 24 (Surgery)     Allergies:  Patient has no known allergies.  Restrictions/Precautions:    Post Surgical Precautions: Per Dr. Veronica's protocol      Medications Last Reviewed:  3/15/2024     SUBJECTIVE   History of Injury/Illness (Reason for Referral):  Patient reports injuring right knee will snow skiing.  On 2024 patient had right knee arthroscopy with partial lateral meniscectomy/ACL reconstruction with soft tissue allograft.  Patient rates pain level in right knee as 2/10.  Patient ambulates into clinic with crutches-NWB right lower extremity with brace on right knee.    Patient Stated Goal(s):  \"To get back to

## 2024-03-18 ENCOUNTER — APPOINTMENT (OUTPATIENT)
Dept: ULTRASOUND IMAGING | Age: 49
End: 2024-03-18
Payer: COMMERCIAL

## 2024-03-18 ENCOUNTER — HOSPITAL ENCOUNTER (OUTPATIENT)
Dept: PHYSICAL THERAPY | Age: 49
Setting detail: RECURRING SERIES
Discharge: HOME | End: 2024-03-21
Payer: COMMERCIAL

## 2024-03-18 ENCOUNTER — HOSPITAL ENCOUNTER (EMERGENCY)
Age: 49
Discharge: HOME | End: 2024-03-18
Payer: COMMERCIAL

## 2024-03-18 VITALS
TEMPERATURE: 98.6 F | OXYGEN SATURATION: 98 % | RESPIRATION RATE: 16 BRPM | BODY MASS INDEX: 27.49 KG/M2 | HEIGHT: 65 IN | SYSTOLIC BLOOD PRESSURE: 134 MMHG | WEIGHT: 165 LBS | DIASTOLIC BLOOD PRESSURE: 76 MMHG | HEART RATE: 97 BPM

## 2024-03-18 DIAGNOSIS — M79.89 LEG SWELLING: Primary | ICD-10-CM

## 2024-03-18 PROCEDURE — 97110 THERAPEUTIC EXERCISES: CPT

## 2024-03-18 PROCEDURE — 93971 EXTREMITY STUDY: CPT

## 2024-03-18 PROCEDURE — 99284 EMERGENCY DEPT VISIT MOD MDM: CPT

## 2024-03-18 ASSESSMENT — PAIN SCALES - GENERAL
PAINLEVEL_OUTOF10: 2
PAINLEVEL_OUTOF10: 1

## 2024-03-18 ASSESSMENT — PAIN - FUNCTIONAL ASSESSMENT: PAIN_FUNCTIONAL_ASSESSMENT: 0-10

## 2024-03-18 ASSESSMENT — LIFESTYLE VARIABLES
HOW OFTEN DO YOU HAVE A DRINK CONTAINING ALCOHOL: NEVER
HOW MANY STANDARD DRINKS CONTAINING ALCOHOL DO YOU HAVE ON A TYPICAL DAY: PATIENT DOES NOT DRINK

## 2024-03-18 ASSESSMENT — PAIN DESCRIPTION - FREQUENCY: FREQUENCY: CONTINUOUS

## 2024-03-18 ASSESSMENT — PAIN DESCRIPTION - ORIENTATION: ORIENTATION: RIGHT

## 2024-03-18 ASSESSMENT — PAIN DESCRIPTION - LOCATION: LOCATION: LEG

## 2024-03-18 ASSESSMENT — PAIN DESCRIPTION - ONSET: ONSET: ON-GOING

## 2024-03-18 ASSESSMENT — PAIN DESCRIPTION - PAIN TYPE: TYPE: SURGICAL PAIN

## 2024-03-18 NOTE — DISCHARGE INSTRUCTIONS
Rest, ice, elevate, avoid painful activities and use the crutches.  Try and loosen the ace wrap some as it may be causing some of the swelling.  Take your medication as directed and follow-up with Dr. Veronica for recheck.

## 2024-03-18 NOTE — PROGRESS NOTES
Best Garcia  : 1975  [place] [employer])  Secondary:  [facility] @ [address]   [place] [address]  Phone: [phone]  Fax: [phone] Plan Frequency: Two times a week for 90 days    Plan of Care/Certification Expiration Date: 24        Plan of Care/Certification Expiration Date:  Plan of Care/Certification Expiration Date: 24    Frequency/Duration:   Plan Frequency: Two times a week for 90 days      Time In/Out:   Time In: 1600  Time Out: 1645      PT Visit Info:    Progress Note Due Date: 24  Total # of Visits to Date: 2  Progress Note Counter: 2      Visit Count:  2    OUTPATIENT PHYSICAL THERAPY:   Treatment Note 3/18/2024       Episode  (PT-Right ACL repair)               Treatment Diagnosis:    Right knee pain, unspecified chronicity  Difficulty in walking, not elsewhere classified  Medical/Referring Diagnosis:    Rupture of anterior cruciate ligament of right knee, initial encounter  Acute lateral meniscus tear of right knee, initial encounter  Injury of right knee, initial encounter  Referring Physician:  Alejandra Govea PA MD Orders:  PT Eval and Treat   Return MD Appt:  2024   Date of Onset:  Onset Date: 24 (Surgery)     Allergies:   Patient has no known allergies.  Restrictions/Precautions:   Post Surgical Precautions: Per Dr. Veronica's protocols      Interventions Planned (Treatment may consist of any combination of the following):     See Assessment Note    Subjective Comments:   \"I am doing ok right now\". \"My calf looks deformed\"  Initial Pain Level::     1/10  Post Session Pain Level:       3/10  Medications Last Reviewed:  3/18/2024  Updated Objective Findings:  None Today  Treatment   THERAPEUTIC EXERCISE: (38 minutes):    Exercises per grid below to improve mobility and strength.  Required minimal verbal cues to promote proper body alignment.  Progressed repetitions as

## 2024-03-18 NOTE — ED NOTES
I have reviewed discharge instructions with the patient.  The patient verbalized understanding.    Patient left ED via Discharge Method: crutches to Home with self.    Opportunity for questions and clarification provided.       Patient given 0 scripts.         To continue your aftercare when you leave the hospital, you may receive an automated call from our care team to check in on how you are doing.  This is a free service and part of our promise to provide the best care and service to meet your aftercare needs.” If you have questions, or wish to unsubscribe from this service please call [phone].  Thank you for Choosing our [facility] Emergency Department.

## 2024-03-18 NOTE — ED TRIAGE NOTES
Pt arrives A&Ox4 ambulatory on crutches w/knee brace. Pt had  R ACL repaired Wednesday last week. PT today said is cold and swollen larger than expected. PT concerned for DVT.

## 2024-03-18 NOTE — ED PROVIDER NOTES
Emergency Department Provider Note       PCP: Unknown, Provider, DO   Age: 49 y.o.   Sex: male     DISPOSITION Decision To Discharge 03/18/2024 06:01:54 PM       ICD-10-CM    1. Leg swelling  M79.89           Medical Decision Making     Patient with no DVT on venous duplex.  This is reassuring.  He was instructed to rest, ice, elevate and follow-up with his Ortho for recheck.  Continue using his brace and crutches.  More than likely he was hanging it down too much today.  All other questions were answered.  He is stable for discharge and his wife is driving him home.     1 or more acute illnesses that pose a threat to life or bodily function.   Shared medical decision making was utilized in creating the patients health plan today.  Considerations: The following items were considered but not ordered: Further evaluation.    I independently ordered and reviewed each unique test.  I reviewed external records: ED visit note from an outside group.   The patients assessment required an independent historian: Wife.  The reason they were needed is important historical information not provided by the patient.                History     Patient is here with right leg swelling that started today after having had a anterior cruciate repair on Wednesday, 5 days ago by Dr. Veronica.  He was at physical therapy today and they noticed that it was swelling.  He is not having any pain.  No prior history of blood clots.  His wife states that he is keeping it elevated.  He ruptured it snow skiing in [place].  No chest pain, shortness of breath, abdominal pain, dizziness, weakness, dyspnea exertion, orthopnea, swelling/tingling or weakness to their arms or legs, trouble with urination or bowel movements or other new symptoms. He did ambulate to the room without difficulty on his crutches and is well hydrated.      The history is provided by the patient and the spouse.       ROS     Review of Systems     Physical Exam     Vitals

## 2024-03-20 ENCOUNTER — HOSPITAL ENCOUNTER (OUTPATIENT)
Dept: PHYSICAL THERAPY | Age: 49
Setting detail: RECURRING SERIES
Discharge: HOME | End: 2024-03-23
Payer: COMMERCIAL

## 2024-03-20 PROCEDURE — 97110 THERAPEUTIC EXERCISES: CPT

## 2024-03-20 ASSESSMENT — PAIN SCALES - GENERAL: PAINLEVEL_OUTOF10: 2

## 2024-03-20 NOTE — PROGRESS NOTES
Name: Best Garcia  YOB: 1975  Gender: male  MRN: [id number]    Procedure: Right Knee Arthroscopy Acl Reconstruction Graftlink Allograft And Partial Lateral Menisectomy - Right    Surgery Date: 3/13/2024    Subjective: Returns 2 weeks status ACL reconstruction.  Pain is controlled improving with motion. He was seen in the ER on 3/18/24 for swelling. He was negative for DVT.     Physical Examination: Incisions are healing well.  Able to activate quad but has appropriate atrophy.  Passive extension to about 5 degrees of hyperextension flexion to 100 degrees.  Patella is mobile. Appropriate effusion.  Motor and sensory intact.    Imaging:  Knee XR: 2 views     Clinical Indication  No diagnosis found.       Report: AP, lateral, views of the right knee demonstrates postsurgical changes from ACL reconstruction with tunnels and fixation hardware in appropriate position    Impression: Status post ACL reconstruction as above       Assessment:   S/P ACL reconstruction with allograft    Plan:     Suture tails clipped Steri-Strips changed today.  Advised ice and compression therapy.  Continue PT per Routine Allograft ACL reconstruction protocol.  Reviewed appropriate postoperative precautions at this point    We will change his rehab facility.    Follow up: 4 weeks

## 2024-03-20 NOTE — PROGRESS NOTES
Best Garcia  : 1975  [place] [employer]  Secondary:  [facility] @ [address]   [place] [address]  Phone: [phone]  Fax: [phone] Plan Frequency: Two times a week for 90 days    Plan of Care/Certification Expiration Date: 24        Plan of Care/Certification Expiration Date:  Plan of Care/Certification Expiration Date: 24    Frequency/Duration:   Plan Frequency: Two times a week for 90 days      Time In/Out:   Time In: 1100  Time Out: 1155      PT Visit Info:    Progress Note Due Date: 24  Total # of Visits to Date: 3  Progress Note Counter: 3      Visit Count:  3    OUTPATIENT PHYSICAL THERAPY:   Treatment Note 3/20/2024       Episode  (PT-Right ACL repair)               Treatment Diagnosis:    Right knee pain, unspecified chronicity  Difficulty in walking, not elsewhere classified  Medical/Referring Diagnosis:    Rupture of anterior cruciate ligament of right knee, initial encounter  Acute lateral meniscus tear of right knee, initial encounter  Injury of right knee, initial encounter  Referring Physician:  Alejandra Govea PA MD Orders:  PT Eval and Treat   Return MD Appt:  2024   Date of Onset:  Onset Date: 24 (Surgery)     Allergies:   Patient has no known allergies.  Restrictions/Precautions:   Post Surgical Precautions: Per Dr. Veronica's protocols      Interventions Planned (Treatment may consist of any combination of the following):     See Assessment Note    Subjective Comments:   Pt went to [facility] after last appt and they sent him to ER.  He had a doppler ultrasound which was negative for DVT.  Initial Pain Level::     2/10  Post Session Pain Level:       2/10  Medications Last Reviewed:  3/20/2024  Updated Objective Findings:  None Today  Treatment   THERAPEUTIC EXERCISE: (45 minutes):    Exercises per grid below to improve mobility and strength.  Required minimal verbal cues

## 2024-03-25 ENCOUNTER — OFFICE VISIT (OUTPATIENT)
Dept: ORTHOPEDIC SURGERY | Age: 49
End: 2024-03-25

## 2024-03-25 ENCOUNTER — HOSPITAL ENCOUNTER (OUTPATIENT)
Dept: PHYSICAL THERAPY | Age: 49
Setting detail: RECURRING SERIES
Discharge: HOME | End: 2024-03-28
Payer: COMMERCIAL

## 2024-03-25 DIAGNOSIS — S89.91XA INJURY OF RIGHT KNEE, INITIAL ENCOUNTER: ICD-10-CM

## 2024-03-25 DIAGNOSIS — S83.281A ACUTE LATERAL MENISCUS TEAR OF RIGHT KNEE, INITIAL ENCOUNTER: ICD-10-CM

## 2024-03-25 DIAGNOSIS — S83.511A RUPTURE OF ANTERIOR CRUCIATE LIGAMENT OF RIGHT KNEE, INITIAL ENCOUNTER: Primary | ICD-10-CM

## 2024-03-25 PROCEDURE — 97110 THERAPEUTIC EXERCISES: CPT

## 2024-03-25 PROCEDURE — 99024 POSTOP FOLLOW-UP VISIT: CPT | Performed by: PHYSICIAN ASSISTANT

## 2024-03-25 ASSESSMENT — PAIN SCALES - GENERAL: PAINLEVEL_OUTOF10: 1

## 2024-03-25 NOTE — PROGRESS NOTES
Best Garcia  : 1975  [place] [employer]  Secondary:  [facility] @ [address] [place]  [place] [address]  Phone: [phone]  Fax: [phone] Plan Frequency: Two times a week for 90 days    Plan of Care/Certification Expiration Date: 24        Plan of Care/Certification Expiration Date:  Plan of Care/Certification Expiration Date: 24    Frequency/Duration:   Plan Frequency: Two times a week for 90 days      Time In/Out:   Time In: 0800  Time Out: 0845      PT Visit Info:    Progress Note Due Date: 24  Total # of Visits to Date: 4  Progress Note Counter: 4      Visit Count:  4    OUTPATIENT PHYSICAL THERAPY:   Treatment Note 3/25/2024       Episode  (PT-Right ACL repair)               Treatment Diagnosis:    Right knee pain, unspecified chronicity  Difficulty in walking, not elsewhere classified  Medical/Referring Diagnosis:    Rupture of anterior cruciate ligament of right knee, initial encounter  Acute lateral meniscus tear of right knee, initial encounter  Injury of right knee, initial encounter  Referring Physician:  Alejandra Govea PA MD Orders:  PT Eval and Treat   Return MD Appt:  2024   Date of Onset:  Onset Date: 24 (Surgery)     Allergies:   Patient has no known allergies.  Restrictions/Precautions:   Post Surgical Precautions: Per Dr. Veronica's protocols      Interventions Planned (Treatment may consist of any combination of the following):     See Assessment Note    Subjective Comments:   \"I am doing pretty good, The swelling went down over the weekend then yesterday it swelled up again\"  Initial Pain Level::     1/10  Post Session Pain Level:       1/10  Medications Last Reviewed:  3/25/2024  Updated Objective Findings:   -5 extension 90 degree flexion  Treatment   THERAPEUTIC EXERCISE: (45 minutes):    Exercises per grid below to improve mobility and strength.  Required minimal 
Appointments   Date Time Provider Department Center   3/25/2024 10:00 AM Alejandra Govea PA [facility]   3/28/2024  4:00 PM Vilma Alexander, PTA [facility]   4/1/2024  4:00 PM Vilma Alexander, PTA [facility]   4/5/2024  3:15 PM Masha Lyle, PT [facility]   4/8/2024  4:00 PM Vilma Alexander, PTA [facility]   4/11/2024  4:00 PM Vilma Alexander, PTA [facility]   4/22/2024  8:00 AM Kash Veronica MD [facility]

## 2024-03-28 ENCOUNTER — HOSPITAL ENCOUNTER (OUTPATIENT)
Dept: PHYSICAL THERAPY | Age: 49
Setting detail: RECURRING SERIES
Discharge: HOME | End: 2024-03-31
Payer: COMMERCIAL

## 2024-03-28 PROCEDURE — 97110 THERAPEUTIC EXERCISES: CPT

## 2024-03-28 ASSESSMENT — PAIN SCALES - GENERAL: PAINLEVEL_OUTOF10: 2

## 2024-03-28 NOTE — PROGRESS NOTES
Best Garcia  : 1975  [place] [employer]  Secondary:  [facility] @ [address] [place]  [place] [address]  Phone: [phone]  Fax: [phone] Plan Frequency: Two times a week for 90 days    Plan of Care/Certification Expiration Date: 24        Plan of Care/Certification Expiration Date:  Plan of Care/Certification Expiration Date: 24    Frequency/Duration:   Plan Frequency: Two times a week for 90 days      Time In/Out:   Time In: 1600  Time Out: 1645      PT Visit Info:    Progress Note Due Date: 24  Total # of Visits to Date: 5  Progress Note Counter: 5      Visit Count:  5    OUTPATIENT PHYSICAL THERAPY:   Treatment Note 3/28/2024       Episode  (PT-Right ACL repair)               Treatment Diagnosis:    Right knee pain, unspecified chronicity  Difficulty in walking, not elsewhere classified  Medical/Referring Diagnosis:    Rupture of anterior cruciate ligament of right knee, initial encounter  Acute lateral meniscus tear of right knee, initial encounter  Injury of right knee, initial encounter  Referring Physician:  Alejandra Govea PA MD Orders:  PT Eval and Treat   Return MD Appt:  2024   Date of Onset:  Onset Date: 24 (Surgery)     Allergies:   Patient has no known allergies.  Restrictions/Precautions:   Post Surgical Precautions: Per Dr. Veronica's protocols      Interventions Planned (Treatment may consist of any combination of the following):     See Assessment Note    Subjective Comments: ( 2 weeks out from surgery )  \"I am doing pretty good, The swelling went down over the weekend then yesterday it swelled up again\"  'The MD said I can ambulate with FWB with brace\"  Initial Pain Level::     2/10  Post Session Pain Level:       2/10  Medications Last Reviewed:  3/28/2024  Updated Objective Findings:   -5 extension 90 degree flexion  Treatment   THERAPEUTIC EXERCISE: (45 minutes):

## 2024-04-01 ENCOUNTER — HOSPITAL ENCOUNTER (OUTPATIENT)
Dept: PHYSICAL THERAPY | Age: 49
Setting detail: RECURRING SERIES
Discharge: HOME | End: 2024-04-04
Payer: COMMERCIAL

## 2024-04-01 PROCEDURE — 97110 THERAPEUTIC EXERCISES: CPT

## 2024-04-01 ASSESSMENT — PAIN SCALES - GENERAL: PAINLEVEL_OUTOF10: 2

## 2024-04-01 NOTE — PROGRESS NOTES
Best Garcia  : 1975  [place] [employer]  Secondary:  [facility] @ [address]   [place] [address]  Phone: [phone]  Fax: [phone] Plan Frequency: Two times a week for 90 days    Plan of Care/Certification Expiration Date: 24        Plan of Care/Certification Expiration Date:  Plan of Care/Certification Expiration Date: 24    Frequency/Duration:   Plan Frequency: Two times a week for 90 days      Time In/Out:   Time In: 1600  Time Out: 1645      PT Visit Info:    Progress Note Due Date: 24  Total # of Visits to Date: 6  Progress Note Counter: 6      Visit Count:  6    OUTPATIENT PHYSICAL THERAPY:   Treatment Note 2024       Episode  (PT-Right ACL repair)               Treatment Diagnosis:    Right knee pain, unspecified chronicity  Difficulty in walking, not elsewhere classified  Medical/Referring Diagnosis:    Rupture of anterior cruciate ligament of right knee, initial encounter  Acute lateral meniscus tear of right knee, initial encounter  Injury of right knee, initial encounter  Referring Physician:  Alejandra Govea PA MD Orders:  PT Eval and Treat   Return MD Appt:  2024   Date of Onset:  Onset Date: 24 (Surgery)     Allergies:   Patient has no known allergies.  Restrictions/Precautions:   Post Surgical Precautions: Per Dr. Veronica's protocols      Interventions Planned (Treatment may consist of any combination of the following):     See Assessment Note    Subjective Comments: ( 3 weeks out from surgery )  \"I went back to work today, my knee was burning at work and then went home and put ice on it and it subsided\"  Initial Pain Level::     2 (burning sensation in knee)/10  Post Session Pain Level:       2/10  Medications Last Reviewed:  2024  Updated Objective Findings:   -0 extension 96 degree flexion  Treatment   THERAPEUTIC EXERCISE: (45 minutes):    Exercises per grid

## 2024-04-05 ENCOUNTER — HOSPITAL ENCOUNTER (OUTPATIENT)
Dept: PHYSICAL THERAPY | Age: 49
Setting detail: RECURRING SERIES
Discharge: HOME | End: 2024-04-08
Payer: COMMERCIAL

## 2024-04-05 PROCEDURE — 97110 THERAPEUTIC EXERCISES: CPT

## 2024-04-05 ASSESSMENT — PAIN SCALES - GENERAL: PAINLEVEL_OUTOF10: 2

## 2024-04-05 NOTE — PROGRESS NOTES
Best Garcia  : 1975  [place] [employer]  Secondary:  [facility] @ [address] [place]  [place] [address]  Phone: [phone]  Fax: [phone] Plan Frequency: Two times a week for 90 days  Plan of Care/Certification Expiration Date: 24        Plan of Care/Certification Expiration Date:  Plan of Care/Certification Expiration Date: 24    Frequency/Duration: Plan Frequency: Two times a week for 90 days      Time In/Out:   Time In: 1515  Time Out: 1600      PT Visit Info:    Progress Note Due Date: 24  Total # of Visits to Date: 7  Progress Note Counter: 1      Visit Count:  7                OUTPATIENT PHYSICAL THERAPY:             Progress Report 2024               Episode (PT-Right ACL repair)         Treatment Diagnosis:     Right knee pain, unspecified chronicity  Difficulty in walking, not elsewhere classified  Medical/Referring Diagnosis:    Rupture of anterior cruciate ligament of right knee, initial encounter  Acute lateral meniscus tear of right knee, initial encounter  Injury of right knee, initial encounter  Referring Physician:  Alejandra Govea PA/Kash Veronica MD MD Orders:  PT Eval and Treat   Return MD Appt:  2024  Date of Onset:  Onset Date: 24 (Surgery)     Allergies:  Patient has no known allergies.  Restrictions/Precautions:    Post Surgical Precautions: Per Dr. Veronica's protocol      Medications Last Reviewed:  2024     SUBJECTIVE   History of Injury/Illness (Reason for Referral):  Patient reports injuring right knee will snow skiing.  On 2024 patient had right knee arthroscopy with partial lateral meniscectomy/ACL reconstruction with soft tissue allograft.  Patient rates pain level in right knee as 2/10.  Patient ambulates into clinic with crutches-NWB right lower extremity with brace on right knee.    Patient Stated Goal(s):  \"To get back to

## 2024-04-05 NOTE — PROGRESS NOTES
Best Garcia  : 1975  [place] [employer]  Secondary:  [facility] @ [address]   [place] [address]  Phone: [phone]  Fax: [phone] Plan Frequency: Two times a week for 90 days    Plan of Care/Certification Expiration Date: 24        Plan of Care/Certification Expiration Date:  Plan of Care/Certification Expiration Date: 24    Frequency/Duration:   Plan Frequency: Two times a week for 90 days      Time In/Out:   Time In: 1515  Time Out: 1600      PT Visit Info:    Progress Note Due Date: 24  Total # of Visits to Date: 7  Progress Note Counter: 1      Visit Count:  7    OUTPATIENT PHYSICAL THERAPY:   Treatment Note 2024       Episode  (PT-Right ACL repair)               Treatment Diagnosis:    Right knee pain, unspecified chronicity  Difficulty in walking, not elsewhere classified  Medical/Referring Diagnosis:    Rupture of anterior cruciate ligament of right knee, initial encounter  Acute lateral meniscus tear of right knee, initial encounter  Injury of right knee, initial encounter  Referring Physician:  Alejandra Govea PA MD Orders:  PT Eval and Treat   Return MD Appt:  2024   Date of Onset:  Onset Date: 24 (Surgery)     Allergies:   Patient has no known allergies.  Restrictions/Precautions:   Post Surgical Precautions: Per Dr. Veronica's protocols      Interventions Planned (Treatment may consist of any combination of the following):     See Assessment Note    Subjective Comments: ( 3 weeks out from surgery )  \"I am doing okay\"  Initial Pain Level::     2/10  Post Session Pain Level:       2/10  Medications Last Reviewed:  2024  Updated Objective Findings:   -0 extension 98 degree flexion  Treatment   THERAPEUTIC EXERCISE: (45 minutes):    Exercises per grid below to improve mobility and strength.  Required minimal verbal cues to promote proper body alignment.  Progressed

## 2024-04-08 ENCOUNTER — HOSPITAL ENCOUNTER (OUTPATIENT)
Dept: PHYSICAL THERAPY | Age: 49
Setting detail: RECURRING SERIES
Discharge: HOME | End: 2024-04-11
Payer: COMMERCIAL

## 2024-04-08 PROCEDURE — 97110 THERAPEUTIC EXERCISES: CPT

## 2024-04-08 ASSESSMENT — PAIN SCALES - GENERAL: PAINLEVEL_OUTOF10: 2

## 2024-04-08 NOTE — PROGRESS NOTES
flexion  Treatment   THERAPEUTIC EXERCISE: (35 minutes):    Exercises per grid below to improve mobility and strength.  Required minimal verbal cues to promote proper body alignment.  Progressed repetitions as indicated.   Date:  3/18/2024 Date:  04-1-24 Date:  4-5-24   Activity/Exercise Parameters Parameters Parameters   Quad sets 2x10 reps 20 reps 20 reps         Supine heel slides  2x10 reps 20 reps 20 reps   Quad activation with straight leg raise 2x10 reps 20 reps 20 reps 3#   Thera band Extension      Tiltboard   Forward/backward 10 reps   Airdyne X 8 min Level 2.0 X 8 min Level 2.0 SEAT & Seat 5 x 8 minutes Rocking   Prone knee flexion   X 15 reps   Gluteal Sets  HEP    SLR Abduction  20 reps #1 20 reps #1   SLR Extension  20 reps #1 20 reps #1   SLR Adduction  20 reps #1 20 reps #1   Hip extension Prone  20 reps #1 20 reps #1   Hamstring Stretch  3 reps  20 sec holds 3 reps  20 sec holds   Bridging  X 15 reps pause 5 seconds X 15 reps pause 5 seconds  NOT TODAY   // mini squats  X 15 reps 20 reps NOT TODAY   Leg press   45 # 20 reps   Knee extension into thera-band Blue  X 20 reps 20 reps   SLS in // bars    To fatigue  X 15 reps  To fatigue  X5 reps   Green therafoam  3 reps right    Ambulating with brace in clinic-no crutches  X 4 laps  heel toe push off X   Standing  knee flexion and extension  X 15 reps X 15 reps   Gastroc Stretch on slant board  3 reps  20 sec holds 3 reps  20 sec holds   MODALITIES:       *  Cold Pack Therapy in order to provide analgesia and reduce inflammation and edema. Game Ready cold pack to R knee x10 minutes to decrease pain and swelling.  Skin clear afterwards.       Treatment/Session Summary:    Treatment Assessment:   Patient progressing well with exercises.  Patient got stuck in traffic so session was less today because of being late.     Communication/Consultation:  None today  Equipment provided today:  HEP  Recommendations/Intent for next treatment session: Next visit

## 2024-04-11 ENCOUNTER — HOSPITAL ENCOUNTER (OUTPATIENT)
Dept: PHYSICAL THERAPY | Age: 49
Setting detail: RECURRING SERIES
Discharge: HOME | End: 2024-04-14
Payer: COMMERCIAL

## 2024-04-11 PROCEDURE — 97110 THERAPEUTIC EXERCISES: CPT

## 2024-04-11 ASSESSMENT — PAIN SCALES - GENERAL: PAINLEVEL_OUTOF10: 3

## 2024-04-11 NOTE — PROGRESS NOTES
improve mobility and strength.  Required minimal verbal cues to promote proper body alignment.  Progressed repetitions as indicated.   Date:  3/18/2024 Date:  04-1-24 Date:  4-11-24   Activity/Exercise Parameters Parameters Parameters   Quad sets 2x10 reps 20 reps 20 reps         Supine heel slides  2x10 reps 20 reps 20 reps   Quad activation with straight leg raise 2x10 reps 20 reps 20 reps 3#   Thera band Extension      Tiltboard   NOT TODAY   Airdyne    Warm up on Nustep then Airdyne X 8 min Level 2.0 X 8 min Level 2.0 SEAT & Seat 5 x 6 minutes Full rotation    Prone knee flexion   X 15 reps   Gluteal Sets  HEP    SLR Abduction  20 reps #1 20 reps #1 NOT TODAY   SLR Extension  20 reps #1 20 reps #1 NOT TODAY   SLR Adduction  20 reps #1 20 reps #1 NOT TODAY   Hip extension Prone  20 reps #1 20 reps #1 NOT TODAY   Hamstring Stretch  3 reps  20 sec holds 3 reps  20 sec holds   Bridging  X 15 reps pause 5 seconds X 15 reps pause 5 seconds  NOT TODAY   // mini squats off wall  Mini lunge stationary in // bars  X 15 reps 15 reps  X 15 reps   Leg press   45 # 20 reps   Knee extension into thera-band Blue  X 20 reps 20 reps   SLS in // bars    To fatigue  X 15 reps  To fatigue  X5 reps   Green therafoam  3 reps right    Ambulating with brace in clinic-no crutches  X 4 laps  heel toe push off X   Standing  knee flexion and extension  X 15 reps X 15 reps   Gastroc Stretch on slant board  3 reps  20 sec holds 3 reps  20 sec holds   MODALITIES:       *  Cold Pack Therapy in order to provide analgesia and reduce inflammation and edema. Game Ready cold pack to R knee x10 minutes to decrease pain and swelling.  Skin clear afterwards.       Treatment/Session Summary:    Treatment Assessment:   Patient progressing well with exercises. Patient gaining ROM into flexion today.     Communication/Consultation:  None today  Equipment provided today:  HEP  Recommendations/Intent for next treatment session: Next visit will focus on quad

## 2024-04-18 ENCOUNTER — HOSPITAL ENCOUNTER (OUTPATIENT)
Dept: PHYSICAL THERAPY | Age: 49
Setting detail: RECURRING SERIES
Discharge: HOME | End: 2024-04-21
Payer: COMMERCIAL

## 2024-04-18 PROCEDURE — 97110 THERAPEUTIC EXERCISES: CPT

## 2024-04-18 ASSESSMENT — PAIN SCALES - GENERAL: PAINLEVEL_OUTOF10: 4

## 2024-04-18 ASSESSMENT — PAIN DESCRIPTION - LOCATION: LOCATION: KNEE

## 2024-04-18 NOTE — PROGRESS NOTES
Best Garcia  : 1975  [place]  Secondary:  [facility] [facility] [place] [facility] [place] [address]  Phone: [phone]  Fax: [phone] Plan Frequency: Two times a week for 90 days    Plan of Care/Certification Expiration Date: 24        Plan of Care/Certification Expiration Date:  Plan of Care/Certification Expiration Date: 24    Frequency/Duration:   Plan Frequency: Two times a week for 90 days      Time In/Out:   Time In: 1603  Time Out: 1645      PT Visit Info:    Progress Note Due Date: 24  Total # of Visits to Date: 10  Progress Note Counter: 4      Visit Count: 10    OUTPATIENT PHYSICAL THERAPY:   Treatment Note 2024       Episode  (PT-Right ACL repair)               Treatment Diagnosis:    Right knee pain, unspecified chronicity  Difficulty in walking, not elsewhere classified  Medical/Referring Diagnosis:    Rupture of anterior cruciate ligament of right knee, initial encounter  Acute lateral meniscus tear of right knee, initial encounter  Injury of right knee, initial encounter  Referring Physician: Alejandra Govea PA MD Orders:  PT Eval and Treat   Return MD Appt:  2024   Date of Onset:  Onset Date: 24 (Surgery)     Allergies:   Patient has no known allergies.  Restrictions/Precautions:   Post Surgical Precautions: Per Dr. Veronica's protocols      Interventions Planned (Treatment may consist of any combination of the following):     See Assessment Note    Subjective Comments: Patient reports he's feeling some pain this afternoon.    Initial Pain Level::   Knee 4/10  Post Session Pain Level:    Knee 2/10     Medications Last Reviewed:  2024  Updated Objective Findings:  0-90 knee flexion  Treatment   THERAPEUTIC EXERCISE: (42 minutes):    Exercises per grid below to improve mobility and strength.  Required minimal verbal cues to promote proper body alignment.  Progressed

## 2024-04-23 ENCOUNTER — HOSPITAL ENCOUNTER (OUTPATIENT)
Dept: PHYSICAL THERAPY | Age: 49
Setting detail: RECURRING SERIES
Discharge: HOME | End: 2024-04-26
Payer: COMMERCIAL

## 2024-04-23 PROCEDURE — 97110 THERAPEUTIC EXERCISES: CPT

## 2024-04-23 ASSESSMENT — PAIN DESCRIPTION - LOCATION: LOCATION: KNEE

## 2024-04-23 ASSESSMENT — PAIN SCALES - GENERAL: PAINLEVEL_OUTOF10: 1

## 2024-04-23 NOTE — PROGRESS NOTES
Best Garcia  : 1975  [place]  Secondary:  [facility] [place] [address]  Phone: [phone]  Fax: [phone] Plan Frequency: Two times a week for 90 days    Plan of Care/Certification Expiration Date: 24        Plan of Care/Certification Expiration Date:  Plan of Care/Certification Expiration Date: 24    Frequency/Duration:   Plan Frequency: Two times a week for 90 days      Time In/Out:   Time In: 1508  Time Out: 1603      PT Visit Info:    Progress Note Due Date: 24  Total # of Visits to Date: 11  Progress Note Counter: 5      Visit Count: 11    OUTPATIENT PHYSICAL THERAPY:   Treatment Note 2024       Episode  (PT-Right ACL repair)               Treatment Diagnosis:    Right knee pain, unspecified chronicity  Difficulty in walking, not elsewhere classified  Medical/Referring Diagnosis:    Rupture of anterior cruciate ligament of right knee, initial encounter  Acute lateral meniscus tear of right knee, initial encounter  Injury of right knee, initial encounter  Referring Physician: Alejandra Govea PA MD Orders: PT Eval and Treat   Return MD Appt: 2024   Date of Onset: 3/13/24 (DOS)  Allergies: Patient has no known allergies.  Restrictions/Precautions:   Post Surgical Precautions: Per Dr. Veronica's protocols      Interventions Planned (Treatment may consist of any combination of the following):   See eval note for interventions.    Subjective Comments: Patient reports he's feels a dull ache in his knee this afternoon.    Initial Pain Level:   Knee 1/10  Post Session Pain Level:   Knee 1/10     Medications Last Reviewed: 2024  Updated Objective Findings: 0-90 knee flexion    Treatment     GAIT TRAINING: (0 minutes): Gait training to improve and/or restore physical functioning as related to mobility, strength, balance and coordination. Ambulated with minimal

## 2024-04-25 ENCOUNTER — HOSPITAL ENCOUNTER (OUTPATIENT)
Dept: PHYSICAL THERAPY | Age: 49
Setting detail: RECURRING SERIES
Discharge: HOME | End: 2024-04-28
Payer: COMMERCIAL

## 2024-04-25 PROCEDURE — 97110 THERAPEUTIC EXERCISES: CPT

## 2024-04-25 ASSESSMENT — PAIN DESCRIPTION - LOCATION: LOCATION: KNEE

## 2024-04-25 ASSESSMENT — PAIN SCALES - GENERAL: PAINLEVEL_OUTOF10: 1

## 2024-04-25 NOTE — PROGRESS NOTES
Best Garcia  : 1975  [place]  Secondary:  [facility] [place] [address]  Phone: [phone]  Fax: [phone] Plan Frequency: Two times a week for 90 days    Plan of Care/Certification Expiration Date: 24        Plan of Care/Certification Expiration Date:  Plan of Care/Certification Expiration Date: 24    Frequency/Duration:   Plan Frequency: Two times a week for 90 days      Time In/Out:   Time In: 1601  Time Out: 1641      PT Visit Info:    Progress Note Due Date: 24  Total # of Visits to Date: 12  Progress Note Counter: 6      Visit Count: 12    OUTPATIENT PHYSICAL THERAPY:   Treatment Note 2024       Episode  (PT-Right ACL repair)               Treatment Diagnosis:    Right knee pain, unspecified chronicity  Difficulty in walking, not elsewhere classified  Medical/Referring Diagnosis:    Rupture of anterior cruciate ligament of right knee, initial encounter  Acute lateral meniscus tear of right knee, initial encounter  Injury of right knee, initial encounter  Referring Physician: Alejandra Govea PA MD Orders: PT Eval and Treat   Return MD Appt: 2024   Date of Onset: 3/13/24 (DOS)  Allergies: Patient has no known allergies.  Restrictions/Precautions:   Post Surgical Precautions: Per Dr. Veronica's protocols      Interventions Planned (Treatment may consist of any combination of the following):   See eval note for interventions.    Subjective Comments: Patient reports he was feeling a little sore in his calves after his last session.    Initial Pain Level:   Knee 1/10  Post Session Pain Level:   Knee 1/10     Medications Last Reviewed: 2024  Updated Objective Findings: 0-90 knee flexion    Treatment     GAIT TRAINING: (0 minutes): Gait training to improve and/or restore physical functioning as related to mobility, strength, balance and coordination. Ambulated

## 2024-04-29 ENCOUNTER — HOSPITAL ENCOUNTER (OUTPATIENT)
Dept: PHYSICAL THERAPY | Age: 49
Setting detail: RECURRING SERIES
Discharge: HOME | End: 2024-05-02
Payer: COMMERCIAL

## 2024-04-29 ENCOUNTER — OFFICE VISIT (OUTPATIENT)
Dept: ORTHOPEDIC SURGERY | Age: 49
End: 2024-04-29

## 2024-04-29 DIAGNOSIS — Z09 S/P ORTHOPEDIC SURGERY, FOLLOW-UP EXAM: Primary | ICD-10-CM

## 2024-04-29 PROCEDURE — 97110 THERAPEUTIC EXERCISES: CPT

## 2024-04-29 PROCEDURE — 99024 POSTOP FOLLOW-UP VISIT: CPT | Performed by: ORTHOPAEDIC SURGERY

## 2024-04-29 ASSESSMENT — PAIN DESCRIPTION - LOCATION: LOCATION: KNEE

## 2024-04-29 ASSESSMENT — PAIN SCALES - GENERAL: PAINLEVEL_OUTOF10: 2

## 2024-04-29 NOTE — PROGRESS NOTES
Name: Best Garcia  YOB: 1975  Gender: male  MRN: [id number]      Procedure: Right Knee Arthroscopy Acl Reconstruction Graftlink Allograft And Partial Lateral Menisectomy - Right    Surgery Date: 3/13/2024          Subjective: Returns 6 weeks status post ACL.  Pain is controlled improving with motion.  He states he is doing well and continues physical therapy.      Physical Examination: Incisions are well-healed.  Able to activate quad but has substantial atrophy.  He does have a mild effusion passive extension to about 3 degrees of hyperextension flexion to 110 degrees.  Solid endpoint on Lachman's no joint line tenderness to palpation .  Motor and sensory intact.      Assessment:   S/P ACL Reconstruction    Plan:     Continue PT per ACL protocol.  Progressing with range of motion.  We reviewed appropriate precautions at this point in the rehab.  Encouraged him to consider using compression and continue to focus on regaining his flexion range of motion.      Follow up: 8 weeks

## 2024-04-29 NOTE — PROGRESS NOTES
balance and coordination. Ambulated with minimal visual, verbal, and tactile cueing related to stance phase, stride length, push off and heel strike.     THERAPEUTIC EXERCISE: (40 minutes): Exercises per grid below to improve mobility, strength, and balance. Required minimal visual, verbal and tactile cues to promote proper body alignment, posture and body mechanics. Progressed resistance, range and repetitions as indicated.    MANUAL THERAPY: (0 minutes): Joint mobilization, soft tissue mobilization and manipulation was utilized and necessary because of the patient's restricted joint motion, painful spasm, loss of articular motion and restricted motion of soft tissue.     MODALITIES: (0 minutes): Vasopneumatic compression at 34 degrees      Date: 4/18/24 Date: 4/23/24 Date: 4/25/24 Date: 4/29/24   Activity/Exercise       Patient assessment/education         Airdyne bike    4 minutes 4 minutes 5 minutes   Quad sets    1 x 15 x 3 sec R   Prone     SLR   1 x 10 R  1 x 20 R    Squats   1 x 10 2 x 8 with 30# KB goblet     Leg press machine    1 x 10 B 50#  2 x 8 push B, down R 30#     Knee extension machine   1 x 10 B 50#  1 x 10 B 60#  1 x 10 B 70# 4 x 30 sec R - isometric     TKE   2 x 10 x 3 sec R  Thick purple band  2 x 10 x 3 sec R  Thick purple band    Nima step overs   2 x 6 R  Green nima  4 x 6 R  Green nima    Sit to stands     3 x 10 on mat table  Yoga block under L foot    KB deadlifts    1 x 10 with 15# KB  1 x 10 with 30# KB     Heel raises    2 x 10 B 80#     Toe raises     2 x 20 B  Leaning against wall    Incline board stretch    1 x 60 sec     Step ups    2 x 8 R - 6\" step  2 x 10 each side - 12\" step   Lunges     2 x 10  Tapping knee to foam roller    Straight leg lift overs     1 x 10 half foam roller  2 x 10 tall yoga block    Isometric knee extension      1 x 10 x 5 sec R  Into exercise ball   Captain Hope's      2 x 8 each side  Cues against knee valgus   Prone hamstring curls      2 x 10 B

## 2024-05-02 ENCOUNTER — HOSPITAL ENCOUNTER (OUTPATIENT)
Dept: PHYSICAL THERAPY | Age: 49
Setting detail: RECURRING SERIES
Discharge: HOME | End: 2024-05-05
Payer: COMMERCIAL

## 2024-05-02 PROCEDURE — 97110 THERAPEUTIC EXERCISES: CPT

## 2024-05-02 ASSESSMENT — PAIN DESCRIPTION - LOCATION: LOCATION: KNEE

## 2024-05-02 ASSESSMENT — PAIN SCALES - GENERAL: PAINLEVEL_OUTOF10: 1

## 2024-05-02 NOTE — PROGRESS NOTES
well this afternoon. He did well with new exercises today.  Communication/Consultation: None today  Equipment provided today: HEP  Recommendations/Intent for next treatment session: Next visit will focus on LE strengthening and ROM.    >Total Treatment Billable Duration: 39 minutes   Time In: 1601  Time Out: 1640    Kp Fishman PT       Charge Capture  [employer]  Appt Desk     Future Appointments   Date Time Provider Department Center   5/7/2024  4:45 PM Kp Fishman, PT [facility]   5/9/2024  4:45 PM Kp Fishman, PT [facility]   5/14/2024  4:45 PM Kp Fishman, PT [facility]   5/16/2024  4:45 PM Kp Fishman, PT [facility]   5/21/2024  4:45 PM Kp Fishman, PT [facility]   5/23/2024  4:45 PM Kp Fishman, PT [facility]   5/28/2024  4:45 PM Kp Fishman, PT [facility]   5/30/2024  4:45 PM Kp Fishman, PT [facility]   6/24/2024  8:20 AM Kash Veronica MD [facility]

## 2024-05-07 ENCOUNTER — HOSPITAL ENCOUNTER (OUTPATIENT)
Dept: PHYSICAL THERAPY | Age: 49
Setting detail: RECURRING SERIES
Discharge: HOME | End: 2024-05-10
Payer: COMMERCIAL

## 2024-05-07 PROCEDURE — 97110 THERAPEUTIC EXERCISES: CPT

## 2024-05-07 ASSESSMENT — PAIN SCALES - GENERAL: PAINLEVEL_OUTOF10: 1

## 2024-05-07 ASSESSMENT — PAIN DESCRIPTION - LOCATION: LOCATION: KNEE

## 2024-05-07 NOTE — PROGRESS NOTES
\Best Garcia  : 1975  [place]  Secondary:  [facility] [place] [address]  Phone: [phone]  Fax: [phone] Plan Frequency: Two times a week for 90 days    Plan of Care/Certification Expiration Date: 24        Plan of Care/Certification Expiration Date:  Plan of Care/Certification Expiration Date: 24    Frequency/Duration:   Plan Frequency: Two times a week for 90 days      Time In/Out:   Time In: 1647  Time Out: 1728      PT Visit Info:    Progress Note Due Date: 24  Total # of Visits to Date: 15  Progress Note Counter: 9      Visit Count: 15    OUTPATIENT PHYSICAL THERAPY:   Treatment Note 2024       Episode  (PT-Right ACL repair)               Treatment Diagnosis:    Right knee pain, unspecified chronicity  Difficulty in walking, not elsewhere classified  Medical/Referring Diagnosis:    Rupture of anterior cruciate ligament of right knee, initial encounter  Acute lateral meniscus tear of right knee, initial encounter  Injury of right knee, initial encounter  Referring Physician: Alejandra Govea PA MD Orders: PT Eval and Treat   Return MD Appt: 2024   Date of Onset: 3/13/24 (DOS)  Allergies: Patient has no known allergies.  Restrictions/Precautions:   Post Surgical Precautions: Per Dr. Veronica's protocols      Interventions Planned (Treatment may consist of any combination of the following):   See eval note for interventions.    Subjective Comments: Patient reports he is feeling good this afternoon. He says he will notice highs and lows in regards to pain throughout the day.    Initial Pain Level:   Knee 1/10  Post Session Pain Level:   Knee 1/10     Medications Last Reviewed: 2024    Updated Objective Findings: None today    Treatment     THERAPEUTIC EXERCISE: (41 minutes): Exercises per grid below to improve mobility, strength, and balance. Required

## 2024-05-09 ENCOUNTER — HOSPITAL ENCOUNTER (OUTPATIENT)
Dept: PHYSICAL THERAPY | Age: 49
Setting detail: RECURRING SERIES
Discharge: HOME | End: 2024-05-12
Payer: COMMERCIAL

## 2024-05-09 PROCEDURE — 97110 THERAPEUTIC EXERCISES: CPT

## 2024-05-09 ASSESSMENT — PAIN SCALES - GENERAL: PAINLEVEL_OUTOF10: 2

## 2024-05-09 ASSESSMENT — PAIN DESCRIPTION - LOCATION: LOCATION: KNEE

## 2024-05-09 NOTE — PROGRESS NOTES
\Best Garcia  : 1975  [place]  Secondary:  [facility] [place] [address]  Phone: [phone]  Fax: [phone] Plan Frequency: Two times a week for 90 days    Plan of Care/Certification Expiration Date: 24        Plan of Care/Certification Expiration Date:  Plan of Care/Certification Expiration Date: 24    Frequency/Duration:   Plan Frequency: Two times a week for 90 days      Time In/Out:   Time In: 1647  Time Out: 1728      PT Visit Info:    Progress Note Due Date: 24  Total # of Visits to Date: 16  Progress Note Counter: 10      Visit Count: 16    OUTPATIENT PHYSICAL THERAPY:   Treatment Note 2024       Episode  (PT-Right ACL repair)               Treatment Diagnosis:    Right knee pain, unspecified chronicity  Difficulty in walking, not elsewhere classified  Medical/Referring Diagnosis:    Rupture of anterior cruciate ligament of right knee, initial encounter  Acute lateral meniscus tear of right knee, initial encounter  Injury of right knee, initial encounter  Referring Physician: Alejandra Govea PA MD Orders: PT Eval and Treat   Return MD Appt: 2024   Date of Onset: 3/13/24 (DOS)  Allergies: Patient has no known allergies.  Restrictions/Precautions:   Post Surgical Precautions: Per Dr. Veronica's protocols      Interventions Planned (Treatment may consist of any combination of the following):   See eval note for interventions.    Subjective Comments: Patient reports he is feeling a little stiff today.    Initial Pain Level:   Knee 2/10  Post Session Pain Level:   Knee 1/10     Medications Last Reviewed: 2024    Updated Objective Findings: None today    Treatment     THERAPEUTIC EXERCISE: (41 minutes): Exercises per grid below to improve mobility, strength, and balance. Required minimal visual, verbal and tactile cues to promote proper body alignment, posture

## 2024-05-14 ENCOUNTER — HOSPITAL ENCOUNTER (OUTPATIENT)
Dept: PHYSICAL THERAPY | Age: 49
Setting detail: RECURRING SERIES
Discharge: HOME | End: 2024-05-17
Payer: COMMERCIAL

## 2024-05-14 PROCEDURE — 97110 THERAPEUTIC EXERCISES: CPT

## 2024-05-14 ASSESSMENT — PAIN SCALES - GENERAL: PAINLEVEL_OUTOF10: 2

## 2024-05-14 ASSESSMENT — PAIN DESCRIPTION - LOCATION: LOCATION: KNEE

## 2024-05-14 NOTE — PROGRESS NOTES
\Best Garcia  : 1975  [place]  Secondary:  [facility] [place] [address]  Phone: [phone]  Fax: [phone] Plan Frequency: Two times a week for 90 days    Plan of Care/Certification Expiration Date: 24        Plan of Care/Certification Expiration Date:  Plan of Care/Certification Expiration Date: 24    Frequency/Duration:   Plan Frequency: Two times a week for 90 days      Time In/Out:   Time In: 1643  Time Out: 1727      PT Visit Info:    Progress Note Due Date: 24  Total # of Visits to Date: 17  Progress Note Counter: 1      Visit Count: 17    OUTPATIENT PHYSICAL THERAPY:   Treatment Note 2024       Episode  (PT-Right ACL repair)               Treatment Diagnosis:    Right knee pain, unspecified chronicity  Difficulty in walking, not elsewhere classified  Medical/Referring Diagnosis:    Rupture of anterior cruciate ligament of right knee, initial encounter  Acute lateral meniscus tear of right knee, initial encounter  Injury of right knee, initial encounter  Referring Physician: Alejandra Govea PA MD Orders: PT Eval and Treat   Return MD Appt: 24  Date of Onset: 3/13/24 (DOS)  Allergies: Patient has no known allergies.  Restrictions/Precautions:   Post Surgical Precautions: Per Dr. Veronica's protocols      Interventions Planned (Treatment may consist of any combination of the following):   See eval note for interventions.    Subjective Comments: Patient reports he is feeling a little pain today. He says he blames it on the weather.    Initial Pain Level:   Knee 2/10  Post Session Pain Level:   Knee 2/10     Medications Last Reviewed: 2024    Updated Objective Findings: See other note for new measures.    Treatment     THERAPEUTIC EXERCISE: (44 minutes): Exercises per grid below to improve mobility, strength, and balance. Required minimal visual, verbal and

## 2024-05-14 NOTE — PROGRESS NOTES
Best Garcia  : 1975  [place]  Secondary:  [facility] [address] [place], [place] [address] [address]  Phone: [phone]  Fax: [phone] Plan Frequency: Two times a week for 90 days  Plan of Care/Certification Expiration Date: 24        Plan of Care/Certification Expiration Date:  Plan of Care/Certification Expiration Date: 24    Frequency/Duration: Plan Frequency: Two times a week for 90 days      Time In/Out:   Time In: 1643  Time Out: 1727      PT Visit Info:    Progress Note Due Date: 24  Total # of Visits to Date: 17  Progress Note Counter: 1      Visit Count: 17                OUTPATIENT PHYSICAL THERAPY:             Progress Report 2024               Episode (PT-Right ACL repair)         Treatment Diagnosis:     Right knee pain, unspecified chronicity  Difficulty in walking, not elsewhere classified  Medical/Referring Diagnosis:    Rupture of anterior cruciate ligament of right knee, initial encounter  Acute lateral meniscus tear of right knee, initial encounter  Injury of right knee, initial encounter  Referring Physician:  Alejandra Govea PA / Kash Veronica MD MD Orders: PT Eval and Treat   Return MD Appt: TBD  Date of Onset:  Onset Date: 24 (Surgery)     Allergies:  Patient has no known allergies.  Restrictions/Precautions:    Post Surgical Precautions: Per Dr. Veronica's protocol      Medications Last Reviewed:  2024     SUBJECTIVE   History of Injury/Illness (Reason for Referral):  Patient reports injuring right knee will snow skiing.  On 2024 patient had right knee arthroscopy with partial lateral meniscectomy/ACL reconstruction with soft tissue allograft.  Patient rates pain level in right knee as 2/10.  Patient ambulates into clinic with crutches-NWB right lower extremity with brace on right knee.      Patient Stated Goal(s):  \"To get back to

## 2024-05-16 ENCOUNTER — HOSPITAL ENCOUNTER (OUTPATIENT)
Dept: PHYSICAL THERAPY | Age: 49
Setting detail: RECURRING SERIES
Discharge: HOME | End: 2024-05-19
Payer: COMMERCIAL

## 2024-05-16 PROCEDURE — 97110 THERAPEUTIC EXERCISES: CPT

## 2024-05-16 ASSESSMENT — PAIN SCALES - GENERAL: PAINLEVEL_OUTOF10: 2

## 2024-05-16 ASSESSMENT — PAIN DESCRIPTION - LOCATION: LOCATION: KNEE

## 2024-05-16 NOTE — PROGRESS NOTES
\Best Garcia  : 1975  [place]  Secondary:  [facility] [place] [address]  Phone: [phone]  Fax: [phone] Plan Frequency: Two times a week for 90 days    Plan of Care/Certification Expiration Date: 24        Plan of Care/Certification Expiration Date:  Plan of Care/Certification Expiration Date: 24    Frequency/Duration:   Plan Frequency: Two times a week for 90 days      Time In/Out:   Time In: 1646  Time Out: 1724      PT Visit Info:    Progress Note Due Date: 24  Total # of Visits to Date: 18  Progress Note Counter: 2      Visit Count: 18    OUTPATIENT PHYSICAL THERAPY:   Treatment Note 2024       Episode  (PT-Right ACL repair)               Treatment Diagnosis:    Right knee pain, unspecified chronicity  Difficulty in walking, not elsewhere classified  Medical/Referring Diagnosis:    Rupture of anterior cruciate ligament of right knee, initial encounter  Acute lateral meniscus tear of right knee, initial encounter  Injury of right knee, initial encounter  Referring Physician: Alejandra Govea PA MD Orders: PT Eval and Treat   Return MD Appt: 24  Date of Onset: 3/13/24 (DOS)  Allergies: Patient has no known allergies.  Restrictions/Precautions:   Post Surgical Precautions: Per Dr. Veronica's protocols      Interventions Planned (Treatment may consist of any combination of the following):   See eval note for interventions.    Subjective Comments: Patient reports he is having a little pain today. He says he spent a lot of time on a ladder.    Initial Pain Level:   Knee 2/10  Post Session Pain Level:   Knee 1/10     Medications Last Reviewed: 2024    Updated Objective Findings: N/A    Treatment     THERAPEUTIC EXERCISE: (38 minutes): Exercises per grid below to improve mobility, strength, and balance. Required minimal visual, verbal and tactile cues to promote

## 2024-05-21 ENCOUNTER — HOSPITAL ENCOUNTER (OUTPATIENT)
Dept: PHYSICAL THERAPY | Age: 49
Setting detail: RECURRING SERIES
Discharge: HOME | End: 2024-05-24
Payer: COMMERCIAL

## 2024-05-21 PROCEDURE — 97110 THERAPEUTIC EXERCISES: CPT

## 2024-05-21 ASSESSMENT — PAIN DESCRIPTION - LOCATION: LOCATION: KNEE

## 2024-05-21 ASSESSMENT — PAIN SCALES - GENERAL: PAINLEVEL_OUTOF10: 2

## 2024-05-21 NOTE — PROGRESS NOTES
Summary:    Treatment Assessment: Patient tolerated therapy well this afternoon. He was able to progress weight with strengthening exercises.  Communication/Consultation: None today  Equipment provided today: HEP  Recommendations/Intent for next treatment session: Next visit will focus on LE strengthening and ROM.    >Total Treatment Billable Duration: 40 minutes   Time In: 1645  Time Out: 1725    Kp Fishman PT       Charge Capture  [employer]  Appt Desk     Future Appointments   Date Time Provider Department Center   5/23/2024  4:45 PM Kp Fishman, PT [facility]   5/28/2024  4:45 PM Kp Fishman, PT [facility]   5/30/2024  4:45 PM Kp Fishman, PT [facility]   6/6/2024  4:45 PM Kp Fishman, PT [facility]   6/11/2024  4:45 PM Kp Fishman, PT [facility]   6/13/2024  4:45 PM Kp Fishmna, PT [facility]   6/18/2024  4:45 PM Kp Fishman, PT [facility]   6/20/2024  4:45 PM Kp Fishman, PT [facility]   6/24/2024  8:20 AM Kash Veronica MD [facility]   6/25/2024  4:45 PM Kp Fishman, PT [facility]   6/27/2024  4:45 PM Kp Fishman, PT [facility]   7/2/2024  4:45 PM Kp Fishman, PT [facility]

## 2024-05-23 ENCOUNTER — HOSPITAL ENCOUNTER (OUTPATIENT)
Dept: PHYSICAL THERAPY | Age: 49
Setting detail: RECURRING SERIES
Discharge: HOME | End: 2024-05-26
Payer: COMMERCIAL

## 2024-05-23 PROCEDURE — 97110 THERAPEUTIC EXERCISES: CPT

## 2024-05-23 ASSESSMENT — PAIN SCALES - GENERAL: PAINLEVEL_OUTOF10: 2

## 2024-05-23 ASSESSMENT — PAIN DESCRIPTION - LOCATION: LOCATION: KNEE

## 2024-05-23 NOTE — PROGRESS NOTES
\Best Garcia  : 1975  [place]  Secondary:  [facility] [employer] [place] [address]  Phone: [phone]  Fax: [phone] Plan Frequency: Two times a week for 90 days    Plan of Care/Certification Expiration Date: 24        Plan of Care/Certification Expiration Date:  Plan of Care/Certification Expiration Date: 24    Frequency/Duration:   Plan Frequency: Two times a week for 90 days      Time In/Out:   Time In: 1651  Time Out: 1729      PT Visit Info:    Progress Note Due Date: 24  Total # of Visits to Date: 20  Progress Note Counter: 4      Visit Count: 20    OUTPATIENT PHYSICAL THERAPY:   Treatment Note 2024       Episode  (PT-Right ACL repair)               Treatment Diagnosis:    Right knee pain, unspecified chronicity  Difficulty in walking, not elsewhere classified  Medical/Referring Diagnosis:    Rupture of anterior cruciate ligament of right knee, initial encounter  Acute lateral meniscus tear of right knee, initial encounter  Injury of right knee, initial encounter  Referring Physician: Alejandra Govea PA MD Orders: PT Eval and Treat   Return MD Appt: 24  Date of Onset: 3/13/24 (DOS)  Allergies: Patient has no known allergies.  Restrictions/Precautions:   Post Surgical Precautions: Per Dr. Veronica's protocols      Interventions Planned (Treatment may consist of any combination of the following):   See eval note for interventions.    Subjective Comments: Patient reports he's feeling some discomfort this afternoon.     Initial Pain Level:   Knee 2/10   Post Session Pain Level:   Knee 1/10     Medications Last Reviewed: 2024    Updated Objective Findings: N/A    Treatment     THERAPEUTIC EXERCISE: (38 minutes): Exercises per grid below to improve mobility, strength, and balance. Required minimal visual, verbal and tactile cues to promote proper body alignment, posture

## 2024-05-28 ENCOUNTER — HOSPITAL ENCOUNTER (OUTPATIENT)
Dept: PHYSICAL THERAPY | Age: 49
Setting detail: RECURRING SERIES
Discharge: HOME | End: 2024-05-31
Payer: COMMERCIAL

## 2024-05-28 PROCEDURE — 97110 THERAPEUTIC EXERCISES: CPT

## 2024-05-28 ASSESSMENT — PAIN DESCRIPTION - LOCATION: LOCATION: KNEE

## 2024-05-28 ASSESSMENT — PAIN SCALES - GENERAL: PAINLEVEL_OUTOF10: 1

## 2024-05-30 ENCOUNTER — HOSPITAL ENCOUNTER (OUTPATIENT)
Dept: PHYSICAL THERAPY | Age: 49
Setting detail: RECURRING SERIES
End: 2024-05-30
Payer: COMMERCIAL

## 2024-05-30 PROCEDURE — 97110 THERAPEUTIC EXERCISES: CPT

## 2024-05-30 ASSESSMENT — PAIN SCALES - GENERAL: PAINLEVEL_OUTOF10: 1

## 2024-05-30 ASSESSMENT — PAIN DESCRIPTION - LOCATION: LOCATION: KNEE

## 2024-05-30 NOTE — PROGRESS NOTES
\Best Garcia  : 1975  [place]: [place]  Secondary:  [facility] [employer] [place] [address]  Phone: [phone]  Fax: [phone] Plan Frequency: Two times a week for 90 days    Plan of Care/Certification Expiration Date: 24        Plan of Care/Certification Expiration Date:  Plan of Care/Certification Expiration Date: 24    Frequency/Duration:   Plan Frequency: Two times a week for 90 days      Time In/Out:   Time In: 1654  Time Out: 1733      PT Visit Info:    Progress Note Due Date: 24  Total # of Visits to Date: 22  Progress Note Counter: 6      Visit Count: 22    OUTPATIENT PHYSICAL THERAPY:   Treatment Note 2024       Episode  (PT-Right ACL repair)               Treatment Diagnosis:    Right knee pain, unspecified chronicity  Difficulty in walking, not elsewhere classified  Medical/Referring Diagnosis:    Rupture of anterior cruciate ligament of right knee, initial encounter  Acute lateral meniscus tear of right knee, initial encounter  Injury of right knee, initial encounter  Referring Physician: Alejandra Govea PA MD Orders: PT Eval and Treat   Return MD Appt: 24  Date of Onset: 3/13/24 (DOS)  Allergies: Patient has no known allergies.  Restrictions/Precautions:   Post Surgical Precautions: Per Dr. Veronica's protocols      Interventions Planned (Treatment may consist of any combination of the following):   See eval note for interventions.    Subjective Comments: Patient reports he was really sore after using the hip adductor machine last session.    Initial Pain Level:   Knee 1/10   Post Session Pain Level:   Knee 1/10     Medications Last Reviewed: 2024    Updated Objective Findings: N/A    Treatment     THERAPEUTIC EXERCISE: (39 minutes): Exercises per grid below to improve mobility, strength, and balance. Required minimal visual, verbal and tactile cues to promote proper  body alignment, posture and body mechanics. Progressed resistance, range and repetitions as indicated.    MANUAL THERAPY: (0 minutes): Joint mobilization, soft tissue mobilization and manipulation was utilized and necessary because of the patient's restricted joint motion, painful spasm, loss of articular motion and restricted motion of soft tissue.     MODALITIES: (0 minutes): Vasopneumatic compression at 34 degrees     Date: 5/21/24 Date: 5/23/24 Date: 5/28/24 Date: 5/30/24   Activity/Exercise       Patient assessment/education         Airdyne bike   Warmed up prior Warmed up prior Warmed up prior 5 minutes   Quad sets         SLR         Squats         Leg press machine   1 x 15 B 60#  1 x 10 R 60#  1 x 10 R 80#  1 x 10 each side 80#  2 x 10 each side 100#    Knee extension machine     1 x 10 each side 20#    Hip adduction machine     3 x 10 B 50#    Hip abduction machine   1 x 10 B 70#  1 x 10 B 80#      TKE         Senait step overs         Sit to stands         RDLs   1 x 10 with 25# KB  1 x 13 with 55# KB   1 x 8 with 55# KB   Heel raises     2 x 20 B 150#  Seated    Toe raises         Incline board stretch   2 x 60 sec 2 x 60 sec     Step ups      1 x 10 each side 30#  1 x 10 each side 45#  1 x 10 each side 55#   Lunges     2 x 60 feet  Walking    Lateral lunges      3 x 6 each side  Using glider under foot   Straight leg lift overs         Isometric knee extension         Captcarlos Hope's         Prone hamstring curls         Lateral heel taps         Forward heel taps   2 x 8 each side - 2\" step  Cues to use knee versus hip      Split stance isometric holds         Side steps against resistance   4 x 15 feet  Orange TB at midfoot      Single-leg stance    2 bouts on curved Bosu   Each side     Y balance         Single-leg cone reaches    1 set of 5 passes B  Diagonals & forward     Single-leg bridges   2 x 10 each side  Supine      \"Stay low\" alternating lunges    2 x 8 each side     Sled push    1 x 100

## 2024-06-04 ENCOUNTER — HOSPITAL ENCOUNTER (OUTPATIENT)
Dept: PHYSICAL THERAPY | Age: 49
Setting detail: RECURRING SERIES
Discharge: HOME | End: 2024-06-07
Payer: COMMERCIAL

## 2024-06-04 PROCEDURE — 97110 THERAPEUTIC EXERCISES: CPT

## 2024-06-04 ASSESSMENT — PAIN SCALES - GENERAL: PAINLEVEL_OUTOF10: 0

## 2024-06-04 ASSESSMENT — PAIN DESCRIPTION - LOCATION: LOCATION: KNEE

## 2024-06-04 NOTE — PROGRESS NOTES
\Best Garcia  : 1975  [place]: [place])  Secondary:  [facility] [employer] [place] [address]  Phone: [phone]  Fax: [phone] Plan Frequency: Two times a week for 90 days    Plan of Care/Certification Expiration Date: 24        Plan of Care/Certification Expiration Date:  Plan of Care/Certification Expiration Date: 24    Frequency/Duration:   Plan Frequency: Two times a week for 90 days      Time In/Out:   Time In: 1645  Time Out: 1724      PT Visit Info:    Progress Note Due Date: 24  Total # of Visits to Date: 23  Progress Note Counter: 7      Visit Count: 23    OUTPATIENT PHYSICAL THERAPY:   Treatment Note 2024       Episode  (PT-Right ACL repair)               Treatment Diagnosis:    Right knee pain, unspecified chronicity  Difficulty in walking, not elsewhere classified  Medical/Referring Diagnosis:    Rupture of anterior cruciate ligament of right knee, initial encounter  Acute lateral meniscus tear of right knee, initial encounter  Injury of right knee, initial encounter  Referring Physician: Alejandra Govea PA MD Orders: PT Eval and Treat   Return MD Appt: 24  Date of Onset: 3/13/24 (DOS)  Allergies: Patient has no known allergies.  Restrictions/Precautions:   Post Surgical Precautions: Per Dr. Veronica's protocols      Interventions Planned (Treatment may consist of any combination of the following):   See eval note for interventions.    Subjective Comments: Patient reports he's not really feeling any pain this afternoon.    Initial Pain Level:   Knee 0/10   Post Session Pain Level:   Knee 0/10     Medications Last Reviewed: 2024    Updated Objective Findings: N/A    Treatment     THERAPEUTIC EXERCISE: (39 minutes): Exercises per grid below to improve mobility, strength, and balance. Required minimal visual, verbal and tactile cues to promote proper body alignment, posture

## 2024-06-06 ENCOUNTER — HOSPITAL ENCOUNTER (OUTPATIENT)
Dept: PHYSICAL THERAPY | Age: 49
Setting detail: RECURRING SERIES
Discharge: HOME | End: 2024-06-09
Payer: COMMERCIAL

## 2024-06-06 PROCEDURE — 97110 THERAPEUTIC EXERCISES: CPT

## 2024-06-06 ASSESSMENT — PAIN DESCRIPTION - LOCATION: LOCATION: KNEE

## 2024-06-06 NOTE — PROGRESS NOTES
\Best Garcia  : 1975  [place]: [place]  Secondary:  [facility] [employer] [place] [address]  Phone: [phone]  Fax: [phone] Plan Frequency: Two times a week for 90 days    Plan of Care/Certification Expiration Date: 24        Plan of Care/Certification Expiration Date:  Plan of Care/Certification Expiration Date: 24    Frequency/Duration:   Plan Frequency: Two times a week for 90 days      Time In/Out:   Time In: 1636  Time Out: 1718      PT Visit Info:    Progress Note Due Date: 24  Total # of Visits to Date: 24  Progress Note Counter: 8      Visit Count: 24    OUTPATIENT PHYSICAL THERAPY:   Treatment Note 2024       Episode  (PT-Right ACL repair)               Treatment Diagnosis:    Right knee pain, unspecified chronicity  Difficulty in walking, not elsewhere classified  Medical/Referring Diagnosis:    Rupture of anterior cruciate ligament of right knee, initial encounter  Acute lateral meniscus tear of right knee, initial encounter  Injury of right knee, initial encounter  Referring Physician: Alejandra Govea PA MD Orders: PT Eval and Treat   Return MD Appt: 24  Date of Onset: 3/13/24 (DOS)  Allergies: Patient has no known allergies.  Restrictions/Precautions:   Post Surgical Precautions: Per Dr. Veronica's protocols      Interventions Planned (Treatment may consist of any combination of the following):   See eval note for interventions.    Subjective Comments: Patient reports his glutes and quads were sore after his last session.    Initial Pain Level:   Knee 2/10   Post Session Pain Level:   Knee 1/10     Medications Last Reviewed: 2024    Updated Objective Findings: N/A    Treatment     THERAPEUTIC EXERCISE: (42 minutes): Exercises per grid below to improve mobility, strength, and balance. Required minimal visual, verbal and tactile cues to promote proper body alignment,

## 2024-06-11 ENCOUNTER — HOSPITAL ENCOUNTER (OUTPATIENT)
Dept: PHYSICAL THERAPY | Age: 49
Setting detail: RECURRING SERIES
Discharge: HOME | End: 2024-06-14
Payer: COMMERCIAL

## 2024-06-11 PROCEDURE — 97110 THERAPEUTIC EXERCISES: CPT

## 2024-06-11 ASSESSMENT — PAIN DESCRIPTION - LOCATION: LOCATION: KNEE

## 2024-06-11 ASSESSMENT — PAIN SCALES - GENERAL: PAINLEVEL_OUTOF10: 2

## 2024-06-11 NOTE — PROGRESS NOTES
\Best Garcia  : 1975  [place]: [place] [employer])  Secondary:  [facility] [employer] [address] [place] [address]  Phone: [phone]  Fax: [phone] Plan Frequency: Two times a week for 90 days    Plan of Care/Certification Expiration Date: 24        Plan of Care/Certification Expiration Date:  Plan of Care/Certification Expiration Date: 24    Frequency/Duration:   Plan Frequency: Two times a week for 90 days      Time In/Out:   Time In: 1644  Time Out: 1725      PT Visit Info:    Progress Note Due Date: 24  Total # of Visits to Date: 25  Progress Note Counter: 9      Visit Count: 25    OUTPATIENT PHYSICAL THERAPY:   Treatment Note 2024       Episode  (PT-Right ACL repair)               Treatment Diagnosis:    Right knee pain, unspecified chronicity  Difficulty in walking, not elsewhere classified  Medical/Referring Diagnosis:    Rupture of anterior cruciate ligament of right knee, initial encounter  Acute lateral meniscus tear of right knee, initial encounter  Injury of right knee, initial encounter  Referring Physician: Alejandra Govea PA MD Orders: PT Eval and Treat   Return MD Appt: 24  Date of Onset: 3/13/24 (DOS)  Allergies: Patient has no known allergies.  Restrictions/Precautions:   Post Surgical Precautions: Per Dr. Veronica's protocol      Interventions Planned (Treatment may consist of any combination of the following):   See eval note for interventions.    Subjective Comments: Patient reports he's having a little pain today.    Initial Pain Level:   Knee 2/10   Post Session Pain Level:   Knee 0/10     Medications Last Reviewed: 2024    Updated Objective Findings: N/A    Treatment     THERAPEUTIC EXERCISE: (41 minutes): Exercises per grid below to improve mobility, strength, and balance. Required minimal visual, verbal and tactile cues to promote proper body alignment, posture and body

## 2024-06-13 ENCOUNTER — HOSPITAL ENCOUNTER (OUTPATIENT)
Dept: PHYSICAL THERAPY | Age: 49
Setting detail: RECURRING SERIES
Discharge: HOME | End: 2024-06-16
Payer: COMMERCIAL

## 2024-06-13 PROCEDURE — 97530 THERAPEUTIC ACTIVITIES: CPT

## 2024-06-13 ASSESSMENT — PAIN DESCRIPTION - LOCATION: LOCATION: KNEE

## 2024-06-13 ASSESSMENT — PAIN SCALES - GENERAL: PAINLEVEL_OUTOF10: 0

## 2024-06-13 NOTE — PROGRESS NOTES
Patient Name: Best Garcia  Date: 6/13/2024  Surgery Date: 3/13/24  Procedure: R knee arthroscopy w/ partial lateral menis/ACL recon  Month: 3   Clinic: [facility]  Lead Therapist: Kp Fishman, BREANA  Sport:  ACL-R Return to Sport Guidelines       Phase     Return to Running []     Return to Participation []     Return to Sport []     Discharge []       *Completed [x]                                   ROM Right Left Diff +/- Passed Goal   Extension 0 -2 2 [x] Within 2 degrees of contralateral side   Flexion 115 138 23 [] Within 5 degrees of contralateral side       LSI Right     (lbs) Left  (lbs) % P/F   Quad (Q) 85.8 80 107% PASS   Hamstring (H) 42.2 46.5 90.7% PASS   Q-H ratio       Goal RTP: 85% Discharge: 95% or higher    ACL-RSI Score: 48.3   Goal RTP:70%; Discharge 80%    Functional Testing   Right   Left PASS/  FAIL   Goal   Y balance    Equal to non op knee to <4 cm   SL Squat        60-30 degrees  No knee valgus  No excessive forward trunk lean    Drop Jump   (DL to SL)    Looking for Dynamic Knee Valgus, trunk hip and knee strategy with landing   [place] Sport Cord        []   [place] Sport cord Test: >46 passing   Return to Participation Time frame: 5-7 months >80% of non op LE;   Discharge: 8-12 months >90% of non op LE      Hop Testing   Right  (In)   Left  (In)   % Within Passing Range    SL hop    []   Crossover    []   Triple    []   Lateral     []   30s side hop test (40 cm apart) X reps X reps  []   Vertical    []     
cycling\".    Initial Pain Level:    Knee 0/10   Post Session Pain Level:   Knee 0/10    Past Medical History/Comorbidities:   Mr. Garcia  has a past medical history of Rupture of anterior cruciate ligament of right knee, initial encounter. Mr. Garcia  has a past surgical history that includes Cholecystectomy, laparoscopic and knee surgery (Right, 3/13/2024).  Social History/Living Environment:   Patient lives with their family    Prior Level of Function/Work/Activity:   Prior Level of Function: Independent      Learning:   Does the patient/guardian have any barriers to learning?: No barriers  Will there be a co-learner?: No  What is the preferred language of the patient/guardian?: English  Is an  required?: No  How does the patient/guardian prefer to learn new concepts?: Listening; Demonstration; Reading    Fall Risk Scale:   Garcia Total Score: 15    Dominant Side:  right handed  Personal Factors:        Sex:  male        Age:  49 y.o.     OBJECTIVE   Ambulation/WC:     Patient ambulates with normal gait mechanics without an AD  Quality of Movement:   Right knee ROM: 0 degrees extension to 115 degrees flexion    Left knee ROM: 2 degrees hyperextension to 138 degrees flexion  Sensation:   Bilateral lower extremity within normal limits.  Strength:     LSI:  L quad = 80   R quad = 85.83  107.29%  L hamstring = 46.5  R hamstring = 42.16  90.68%    Y Balance:  Right: A 21 in, PL 41 in, M 37 in  Left: A 22 in, PL 42 in, PM 35 in    ASSESSMENT   Initial Assessment: Patient presents with increased pain, decreased range of motion, decreased strength, and difficulty walking due to recent surgery.  Patient would benefit from skilled physical therapy to address problems and goals. Thank you for this referral.      Progress Assessment: Mr. Garcia has participated in 26 PT visits. He demonstrates gains in his LE strength and mobility. He is making good progress in his physical performance. He continues to 
forward heel taps    2 x 10 R on plate  Thick purple band     Curtsy lunges    2 x 8 each side     Wall sits    3 x 30 sec     Nina ropes    2 x 20 sec       Treatment/Session Summary:    Treatment Assessment: Patient undergoing a progress note assessment this visit; see other note for details. He is progressing well in therapy and follows up with Dr. Veronica next week.  Communication/Consultation: None today  Equipment provided today: None  Recommendations/Intent for next treatment session: Next visit will focus on LE strengthening and ROM.    >Total Treatment Billable Duration: 50 minutes   Time In: 1630  Time Out: 1730    Kp Fishman PT     Charge Capture  [employer] Portal  Appt Desk     Future Appointments   Date Time Provider Department Center   6/17/2024 10:10 AM Kash Veronica MD [facility]   6/18/2024  4:45 PM Kp Fishman, PT [facility]   6/20/2024  4:45 PM Kp Fishman, PT [facility]   6/25/2024  4:45 PM Kp Fishman, PT [facility]   6/27/2024  4:45 PM Kp Fishman, PT [facility]   7/16/2024  4:45 PM Kp Fishman, PT [facility]   7/18/2024  4:45 PM Kp Fishman, PT [facility]   7/23/2024  4:45 PM Kp Fishman, PT [facility]   7/25/2024  4:45 PM Kp Fishman, PT [facility]   7/30/2024  4:45 PM Kp Fishman, PT [facility]   8/1/2024  4:45 PM Kp Fishman, PT [facility]   8/6/2024  4:45 PM Kp Fishman, PT [facility]   8/8/2024  4:45 PM Kp Fishman, PT [facility]   8/13/2024  4:45 PM Kp Fishman, PT [facility]   8/15/2024  4:45 PM Kp Fishman, PT [facility]   8/20/2024  4:45 PM Kp Fishman, PT [facility]   8/22/2024  4:45 PM Kp Fishman, PT [facility]   8/27/2024  4:45 PM Kp Fishman, PT [facility]   8/29/2024  4:45 PM Kp Fishman, PT [facility]

## 2024-06-17 ENCOUNTER — OFFICE VISIT (OUTPATIENT)
Dept: ORTHOPEDIC SURGERY | Age: 49
End: 2024-06-17
Payer: COMMERCIAL

## 2024-06-17 DIAGNOSIS — Z09 S/P ORTHOPEDIC SURGERY, FOLLOW-UP EXAM: Primary | ICD-10-CM

## 2024-06-17 PROCEDURE — 99213 OFFICE O/P EST LOW 20 MIN: CPT | Performed by: ORTHOPAEDIC SURGERY

## 2024-06-17 NOTE — PROGRESS NOTES
Name: Best Garcia  YOB: 1975  Gender: male  MRN: [id number]      Procedure: Right Knee Arthroscopy Acl Reconstruction Graftlink Allograft And Partial Lateral Menisectomy - Right    Surgery Date: 3/13/2024          Subjective: Returns 3 months status post ACL.  Pain is controlled improving with motion still struggling a little bit with flexion.      Physical Examination: Incisions are well-healed.  Does have a little bit of mild skin discoloration anteriorly able to activate quad but has substantial atrophy.  Passive extension to about 3 degrees of hyperextension flexion to 115 degrees.  Solid endpoint on Lachman's no joint line tenderness to palpation no effusion.  Motor and sensory intact.      Assessment:   S/P ACL Reconstruction    Plan:     Continue PT per ACL protocol.  Progressing with range of motion.  Stressed the importance of regaining his flexion I do think this is a soft endpoint and is some of hamstring guarding and some just needs more time.  Does not feel like a solid endpoint to me.  We reviewed appropriate precautions at this point in the rehab.  I will see him back sooner if he is not progressing with motion      Follow up: 8 weeks

## 2024-06-18 ENCOUNTER — HOSPITAL ENCOUNTER (OUTPATIENT)
Dept: PHYSICAL THERAPY | Age: 49
Setting detail: RECURRING SERIES
Discharge: HOME | End: 2024-06-21
Payer: COMMERCIAL

## 2024-06-18 PROCEDURE — 97110 THERAPEUTIC EXERCISES: CPT

## 2024-06-18 ASSESSMENT — PAIN SCALES - GENERAL: PAINLEVEL_OUTOF10: 0

## 2024-06-18 ASSESSMENT — PAIN DESCRIPTION - LOCATION: LOCATION: KNEE

## 2024-06-18 NOTE — PROGRESS NOTES
\Best SUKHI Jose  : 1975  Primary: [place])  Secondary:  [facility] [employer] [address] [place] [address]  Phone: [phone]  Fax: [phone] Plan Frequency: Two times a week for 90 days    Plan of Care/Certification Expiration Date: 24        Plan of Care/Certification Expiration Date:  Plan of Care/Certification Expiration Date: 24    Frequency/Duration:   Plan Frequency: Two times a week for 90 days      Time In/Out:   Time In: 1646  Time Out: 1727      PT Visit Info:    Progress Note Due Date: 24  Total # of Visits to Date: 27  Progress Note Counter: 1      Visit Count: 27    OUTPATIENT PHYSICAL THERAPY:   Treatment Note 2024       Episode  (PT-Right ACL repair)               Treatment Diagnosis:    Right knee pain, unspecified chronicity  Difficulty in walking, not elsewhere classified  Medical/Referring Diagnosis:    Rupture of anterior cruciate ligament of right knee, initial encounter  Acute lateral meniscus tear of right knee, initial encounter  Injury of right knee, initial encounter  Referring Physician: Alejandra Govea PA MD Orders: PT Eval and Treat   Return MD Appt: 24  Date of Onset: 3/13/24 (DOS)  Allergies: Patient has no known allergies.  Restrictions/Precautions:   Post Surgical Precautions: Per Dr. Veronica's protocol      Interventions Planned (Treatment may consist of any combination of the following):   See eval note for interventions.    Subjective Comments: Patient reports he's not having any pain today. He had a follow-up with Dr. Veronica earlier this week.    Initial Pain Level:   Knee 0/10   Post Session Pain Level:   Knee 0/10     Medications Last Reviewed: 2024    Updated Objective Findings: N/A    Treatment     THERAPEUTIC EXERCISE: (41 minutes): Exercises per grid below to improve mobility, strength, and balance. Required minimal visual, verbal and tactile cues to

## 2024-06-20 ENCOUNTER — HOSPITAL ENCOUNTER (OUTPATIENT)
Dept: PHYSICAL THERAPY | Age: 49
Setting detail: RECURRING SERIES
Discharge: HOME | End: 2024-06-23
Payer: COMMERCIAL

## 2024-06-20 PROCEDURE — 97110 THERAPEUTIC EXERCISES: CPT

## 2024-06-20 ASSESSMENT — PAIN DESCRIPTION - LOCATION: LOCATION: KNEE

## 2024-06-20 ASSESSMENT — PAIN SCALES - GENERAL: PAINLEVEL_OUTOF10: 1

## 2024-06-20 NOTE — PROGRESS NOTES
sits         Nina ropes         Quadruped knee flexion     Working on knee flexion  Towel wrap behind knee (gap)    Lunge for knee flexion     Working on knee flexion  PT providing tibial IR    Seated passive knee flexion with contract-relax & traction    Working on improving knee ROM with holds & tibial IR/ER   Stair navigation knee flexion and extension (up/down)         Treatment/Session Summary:    Treatment Assessment: Patient tolerated therapy well this session. He reported feeling some burning sensations from his muscles working hard today. PT encouraged him to continue trying knee flexion stretches at home as part of exercise regimen.  Communication/Consultation: None today  Equipment provided today: None  Recommendations/Intent for next treatment session: Next visit will focus on LE strengthening and ROM.    >Total Treatment Billable Duration: 43 minutes  Time In: 1646  Time Out: 1729    Kp Fishman PT       [employer] Capture  [employer] Portal  Appt Desk     Future Appointments   Date Time Provider Department Center   6/25/2024  4:45 PM Kp Fishman, PT [facility]   6/27/2024  4:45 PM Kp Fishman, PT [facility]   7/16/2024  4:45 PM Kp Fishman, PT [facility]   7/18/2024  4:45 PM Kp Fishman, PT [facility]   7/23/2024  4:45 PM Kp Fishman, PT [facility]   7/25/2024  4:45 PM Kp Fishman, PT [facility]   7/30/2024  4:45 PM Kp Fishman, PT [facility]   8/1/2024  4:45 PM Kp Fishman, PT [facility]   8/6/2024  4:45 PM Kp Fishman, PT [facility]   8/8/2024  4:45 PM Kp Fishman, PT [facility]   8/13/2024  4:45 PM Kp Fishman, PT [facility]   8/15/2024  4:45 PM Kp Fishman, PT [facility]   8/19/2024  8:40 AM Kash Veronica MD [facility]   8/20/2024  4:45 PM Kp Fishman, PT [facility]   8/22/2024  4:45 PM Kp Fishman, PT [facility]   8/27/2024  4:45 PM Kp Fishman, PT [facility]   8/29/2024  4:45 PM Kp Fishman, PT [facility]

## 2024-06-25 ENCOUNTER — HOSPITAL ENCOUNTER (OUTPATIENT)
Dept: PHYSICAL THERAPY | Age: 49
Setting detail: RECURRING SERIES
Discharge: HOME | End: 2024-06-28
Payer: COMMERCIAL

## 2024-06-25 PROCEDURE — 97110 THERAPEUTIC EXERCISES: CPT

## 2024-06-25 ASSESSMENT — PAIN DESCRIPTION - LOCATION: LOCATION: LEG

## 2024-06-25 ASSESSMENT — PAIN SCALES - GENERAL: PAINLEVEL_OUTOF10: 1

## 2024-06-25 NOTE — PROGRESS NOTES
\Best Garcia  : 1975  Primary: [place])  Secondary:  [facility] [employer] [facility] [address]  Phone: [phone]  Fax: [phone] Plan Frequency: Two times a week for 90 days    Plan of Care/Certification Expiration Date: 24        Plan of Care/Certification Expiration Date:  Plan of Care/Certification Expiration Date: 24    Frequency/Duration:   Plan Frequency: Two times a week for 90 days      Time In/Out:   Time In: 1655  Time Out: 1727      PT Visit Info:    Progress Note Due Date: 24  Total # of Visits to Date: 29  Progress Note Counter: 3      Visit Count: 29    OUTPATIENT PHYSICAL THERAPY:   Treatment Note 2024       Episode  (PT-Right ACL repair)               Treatment Diagnosis:    Right knee pain, unspecified chronicity  Difficulty in walking, not elsewhere classified  Medical/Referring Diagnosis:    Rupture of anterior cruciate ligament of right knee, initial encounter  Acute lateral meniscus tear of right knee, initial encounter  Injury of right knee, initial encounter  Referring Physician: Alejandra Govea PA MD Orders: PT Eval and Treat   Return MD Appt: 24  Date of Onset: 3/13/24 (DOS)  Allergies: Patient has no known allergies.  Restrictions/Precautions:   Post Surgical Precautions: Per Dr. Veronica's protocol      Interventions Planned (Treatment may consist of any combination of the following):   See eval note for interventions.    Subjective Comments: Patient reports he was having some discomfort in his calf when doing goblet squats on Saturday.    Initial Pain Level:   Leg 1/10   Post Session Pain Level:   Leg 0/10     Medications Last Reviewed: 2024    Updated Objective Findings: N/A    Treatment     THERAPEUTIC EXERCISE: (32 minutes): Exercises per grid below to improve mobility, strength, and balance. Required minimal visual, verbal and tactile cues to promote

## 2024-06-27 ENCOUNTER — HOSPITAL ENCOUNTER (OUTPATIENT)
Dept: PHYSICAL THERAPY | Age: 49
Setting detail: RECURRING SERIES
Discharge: HOME | End: 2024-06-30
Payer: COMMERCIAL

## 2024-06-27 PROCEDURE — 97110 THERAPEUTIC EXERCISES: CPT

## 2024-06-27 ASSESSMENT — PAIN SCALES - GENERAL: PAINLEVEL_OUTOF10: 0

## 2024-06-27 ASSESSMENT — PAIN DESCRIPTION - LOCATION: LOCATION: LEG

## 2024-06-27 NOTE — PROGRESS NOTES
Progressed resistance, range and repetitions as indicated.    MANUAL THERAPY: (0 minutes): Joint mobilization, soft tissue mobilization and manipulation was utilized and necessary because of the patient's restricted joint motion, painful spasm, loss of articular motion and restricted motion of soft tissue.      Date: 6/18/24 Date: 6/20/24 Date: 6/25/24 Date: 6/27/24   Activity/Exercise       Patient assessment/education   Knee flexion stretching HEP Stretching for knee flexion  Trial of hop testing   Airdyne bike   5 minutes (seat 1-2)      Bounding   Working on force absorption and stride length vs stepping  Working on force absorption 60 feet   Bunny hops in place      20 reps   Broad jumps     4 x 25 feet 4 x 50 feet   Skipping      4 x 50 feet  Increased knee pain   Lateral (skater) hops     2 x 8 each side    Bridges   1 x 10 with HS walk-outs        SLR         Squats     2 x 10   30# at end of barbell    Leg press machine         Knee extension machine    1 x 10 25# (upstairs)  1 x 10 30# each side     Hip adduction machine         Hip abduction machine         TKE         Senait step overs         Sit to stands         RDLs         Heel raises    3 x 20 B 50# seated (upstairs)       Toe raises         Incline board stretch     2 x 30 sec    Step ups         Lunges         Lateral lunges         Straight leg lift overs         Isometric knee extension         Captain Hope's         Prone hamstring curls         Lateral heel taps         Forward heel taps         Split stance isometric holds         Side steps against resistance         Single-leg stance         Y balance         Single-leg cone reaches         Single-leg bridges         \"Stay low\" lunges     4 x 25 feet    Sled push / pull   1 x 50' push 135#  1 x 50' pull 135#      Side plank clam shells         Pistol box squats      2 x 8 each side  24\" box   Split squats         TKE step ups         TKE forward heel taps         Curtsy lunges

## 2024-07-02 ENCOUNTER — APPOINTMENT (OUTPATIENT)
Dept: PHYSICAL THERAPY | Age: 49
End: 2024-07-02
Payer: COMMERCIAL

## 2024-07-16 ENCOUNTER — HOSPITAL ENCOUNTER (OUTPATIENT)
Dept: PHYSICAL THERAPY | Age: 49
Setting detail: RECURRING SERIES
Discharge: HOME | End: 2024-07-19
Payer: COMMERCIAL

## 2024-07-16 ENCOUNTER — PATIENT MESSAGE (OUTPATIENT)
Dept: ORTHOPEDIC SURGERY | Age: 49
End: 2024-07-16

## 2024-07-16 PROCEDURE — 97110 THERAPEUTIC EXERCISES: CPT

## 2024-07-16 ASSESSMENT — PAIN SCALES - GENERAL: PAINLEVEL_OUTOF10: 0

## 2024-07-16 ASSESSMENT — PAIN DESCRIPTION - LOCATION: LOCATION: KNEE

## 2024-07-16 NOTE — PROGRESS NOTES
Pistol box squats     2 x 8 each side  24\" box    Split squats         TKE step ups         TKE forward heel taps         Curtsy lunges         Wall sits         Nina ropes         Quadruped knee flexion     Working in knee flexion ROM with sustained holds    Lunge for knee flexion         Seated passive knee flexion with contract-relax & traction Working on improving knee ROM with holds & tibial IR/ER      Stair navigation knee flexion and extension (up/down)       Seated on ball knee flexion     Holding for 30-45 sec bouts R    Prone knee flexion with strap     Sustained holds and contract-relax for more range                    Treatment/Session Summary:    Treatment Assessment: Patient tolerated therapy well this visit. He reported feeling a little discomfort around his patellar tendon with plyometric exercises today. His knee ROM is improving and PT encouraged him to continue with stretching it at home as part of his HEP.  Communication/Consultation: None today  Equipment provided today: None  Recommendations/Intent for next treatment session: Next visit will focus on LE strengthening and ROM.    >Total Treatment Billable Duration: 40 minutes  Time In: 1647  Time Out: 1727    Kp Fishman PT, DPT    Charge Capture  [employer]  Appt Desk     Future Appointments   Date Time Provider Department Center   7/18/2024  4:45 PM Kp Fishman, PT [facility]   7/23/2024  4:45 PM Kp Fishman, PT [facility]   7/25/2024  4:45 PM Kp Fishman, PT [facility]   7/30/2024  4:45 PM Kp Fishman, PT [facility]   8/1/2024  4:45 PM Kp Fishman, PT [facility]   8/6/2024  4:45 PM Kp Fishman, PT [facility]   8/8/2024  4:45 PM Kp Fishman, PT [facility]   8/13/2024  4:45 PM Kp Fishman, PT [facility]   8/15/2024  4:45 PM Kp Fishman, PT [facility]   8/19/2024  2:50 PM Kash Veronica MD [facility]   8/20/2024  4:45 PM Kp Fishman, PT [facility]   8/22/2024  4:45 PM Kp Fishman,

## 2024-07-18 ENCOUNTER — HOSPITAL ENCOUNTER (OUTPATIENT)
Dept: PHYSICAL THERAPY | Age: 49
Setting detail: RECURRING SERIES
Discharge: HOME | End: 2024-07-21
Payer: COMMERCIAL

## 2024-07-18 PROCEDURE — 97110 THERAPEUTIC EXERCISES: CPT

## 2024-07-18 NOTE — PROGRESS NOTES
\Best Garcia  : 1975  [place]  Secondary:  [facility] [employer] [place] [place] [address]  Phone: [phone]  Fax: [phone] Plan Frequency: Two times a week for 90 days    Plan of Care/Certification Expiration Date: 24        Plan of Care/Certification Expiration Date:  Plan of Care/Certification Expiration Date: 24    Frequency/Duration:   Plan Frequency: Two times a week for 90 days      Time In/Out:   Time In: 1642  Time Out: 1723      PT Visit Info:    Progress Note Due Date: 24  Total # of Visits to Date: 32  Progress Note Counter: 6      Visit Count: 32    OUTPATIENT PHYSICAL THERAPY:   Treatment Note 2024       Episode  (PT-Right ACL repair)               Treatment Diagnosis:    Right knee pain, unspecified chronicity  Difficulty in walking, not elsewhere classified  Medical/Referring Diagnosis:    Rupture of anterior cruciate ligament of right knee, initial encounter  Acute lateral meniscus tear of right knee, initial encounter  Injury of right knee, initial encounter  Referring Physician: Alejandra Govea PA MD Orders: PT Eval and Treat   Return MD Appt: 24  Date of Onset: 3/13/24 (DOS)  Allergies: Patient has no known allergies.  Restrictions/Precautions:   Post Surgical Precautions: Per Dr. Veronica's protocol      Interventions Planned (Treatment may consist of any combination of the following):   See eval note for interventions.    Subjective Comments: Patient reports he's not having any pain this afternoon.    Initial Pain Level:   Knee 0/10   Post Session Pain Level:   Knee 0/10     Medications Last Reviewed: 2024    Updated Objective Findings: N/A    Treatment     THERAPEUTIC EXERCISE: (41 minutes): Exercises per grid below to improve mobility, strength, and balance. Required minimal visual, verbal and tactile cues to promote proper body alignment, posture and body

## 2024-07-23 ENCOUNTER — HOSPITAL ENCOUNTER (OUTPATIENT)
Dept: PHYSICAL THERAPY | Age: 49
Setting detail: RECURRING SERIES
Discharge: HOME | End: 2024-07-26
Payer: COMMERCIAL

## 2024-07-23 PROCEDURE — 97110 THERAPEUTIC EXERCISES: CPT

## 2024-07-23 NOTE — PROGRESS NOTES
\Best Garcia  : 1975  [place]  Secondary:  [facility] [place] [facility] [employer] [address] [place] [address]  Phone: [phone]  Fax: [phone] Plan Frequency: Two times a week for 90 days    Plan of Care/Certification Expiration Date: 24        Plan of Care/Certification Expiration Date:  Plan of Care/Certification Expiration Date: 24    Frequency/Duration:   Plan Frequency: Two times a week for 90 days      Time In/Out:   Time In: 1636  Time Out: 1722      PT Visit Info:    Progress Note Due Date: 24  Total # of Visits to Date: 33  Progress Note Counter: 7      Visit Count: 33    OUTPATIENT PHYSICAL THERAPY:   Treatment Note 2024       Episode  (PT-Right ACL repair)               Treatment Diagnosis:    Right knee pain, unspecified chronicity  Difficulty in walking, not elsewhere classified  Medical/Referring Diagnosis:    Rupture of anterior cruciate ligament of right knee, initial encounter  Acute lateral meniscus tear of right knee, initial encounter  Injury of right knee, initial encounter  Referring Physician: Alejandra Govea PA MD Orders: PT Eval and Treat   Return MD Appt: 24  Date of Onset: 3/13/24 (DOS)  Allergies: Patient has no known allergies.  Restrictions/Precautions:   Post Surgical Precautions: Per Dr. Veronica's protocol      Interventions Planned (Treatment may consist of any combination of the following):   See eval note for interventions.    Subjective Comments: Patient reports his knee is not having any pain in his knee today.    Initial Pain Level:   Knee 0/10  Post Session Pain Level:   Knee 0/10    Medications Last Reviewed: 2024    Updated Objective Findings: N/A    Treatment     THERAPEUTIC EXERCISE: (46 minutes): Exercises per grid below to improve mobility, strength, and balance. Required minimal visual, verbal and tactile cues to promote proper body alignment, posture

## 2024-07-25 ENCOUNTER — HOSPITAL ENCOUNTER (OUTPATIENT)
Dept: PHYSICAL THERAPY | Age: 49
Setting detail: RECURRING SERIES
Discharge: HOME | End: 2024-07-28
Payer: COMMERCIAL

## 2024-07-25 PROCEDURE — 97110 THERAPEUTIC EXERCISES: CPT

## 2024-07-25 ASSESSMENT — PAIN SCALES - GENERAL: PAINLEVEL_OUTOF10: 0

## 2024-07-25 ASSESSMENT — PAIN DESCRIPTION - LOCATION: LOCATION: KNEE

## 2024-07-25 NOTE — PROGRESS NOTES
Split squats         TKE step ups         TKE forward heel taps         Curtsy lunges         Wall sits         Nina ropes         Quadruped knee flexion         Lunge for knee flexion         Seated passive knee flexion with contract-relax & traction       Stair navigation knee flexion and extension (up/down)       Seated on ball knee flexion         Prone knee flexion with strap         Assisted [place] HS curls     2 x 8  PT holding calves    Bridge HS curls with sliders     2 x 8 each side SL  1 x 8 B    Front foot elevated reverse lunges into step ups   2 x 6 each side  6\" step    1.5 goblet squats     1 x 10 20# KB  1 x 10 35# KB  1 x 10 70# KB    Rear foot elevated split stance rockers   1 x 10 each side  Prop under front heel    Banded monster walks     2 x 40 feet with orange TB  2 x 40 feet with green TB    Barbell RDLs      2 x 6 65#   Split stance pulses with hold      2 x 10 each side  12# in each hand   Reverse lunges with foam roller cue for knees over toes    2 x 6 each side   Single-leg bridges with feet elevated    2 x 5 each side  Using bench + 24\" box   Retro sled walking           Treatment/Session Summary:    Treatment Assessment: Patient tolerated therapy well this afternoon. He denied reports of pain at the end of the session.  Communication/Consultation: None today  Equipment provided today: None  Recommendations/Intent for next treatment session: Next visit will focus on LE strengthening and ROM.    >Total Treatment Billable Duration: 38 minutes  Time In: 1631  Time Out: 1709    Kp Fishman PT, DPT    [employer]ge [employer]  [employer]  Appt Desk  Attendance Report    Future Appointments   Date Time Provider Department Center   7/30/2024  4:45 PM Kp Fishman, PT [facility]   8/1/2024  4:45 PM Kp Fishman, PT [facility]   8/6/2024  4:45 PM Kp Fishman, PT [facility]   8/8/2024  4:45 PM Kp Fishman, PT [facility]   8/13/2024  4:45 PM Kp Fishman, PT [facility]

## 2024-07-30 ENCOUNTER — HOSPITAL ENCOUNTER (OUTPATIENT)
Dept: PHYSICAL THERAPY | Age: 49
Setting detail: RECURRING SERIES
Discharge: HOME | End: 2024-08-02
Payer: COMMERCIAL

## 2024-07-30 PROCEDURE — 97110 THERAPEUTIC EXERCISES: CPT

## 2024-07-30 ASSESSMENT — PAIN SCALES - GENERAL: PAINLEVEL_OUTOF10: 0

## 2024-07-30 ASSESSMENT — PAIN DESCRIPTION - LOCATION: LOCATION: KNEE

## 2024-07-30 NOTE — PROGRESS NOTES
\Best SUKHI Jose  : 1975  [place])  Secondary:  [facility] [employer] [address] [place] [address]  Phone: [phone]  Fax: [phone] Plan Frequency: Two times a week for 90 days    Plan of Care/Certification Expiration Date: 24             Plan of Care/Certification Expiration Date:  Plan of Care/Certification Expiration Date: 24    Frequency/Duration:   Plan Frequency: Two times a week for 90 days      Time In/Out:   Time In: 1646  Time Out: 1724      PT Visit Info:    Progress Note Due Date: 24  Total # of Visits to Date: 35  Progress Note Counter: 9      Visit Count: 35    OUTPATIENT PHYSICAL THERAPY:   Treatment Note 2024       Episode  (PT-Right ACL repair)               Treatment Diagnosis:    Right knee pain, unspecified chronicity  Difficulty in walking, not elsewhere classified  Medical/Referring Diagnosis:    Rupture of anterior cruciate ligament of right knee, initial encounter  Acute lateral meniscus tear of right knee, initial encounter  Injury of right knee, initial encounter  Referring Physician: Alejandra Govea PA MD Orders: PT Eval and Treat   Return MD Appt: 24  Date of Onset: 3/13/24 (DOS)  Allergies: Patient has no known allergies.  Restrictions/Precautions:   Post Surgical Precautions: Per Dr. Veronica's protocol      Interventions Planned (Treatment may consist of any combination of the following):   See eval note for interventions.    Subjective Comments: Patient reports his calf is feeling a little tight. He says that he joined a gym a few days ago.    Initial Pain Level:   Knee 0/10  Post Session Pain Level:   Knee 0/10     Medications Last Reviewed: 2024    Updated Objective Findings: N/A    Treatment     THERAPEUTIC EXERCISE: (38 minutes): Exercises per grid below to improve mobility, strength, and balance. Required minimal visual, verbal and tactile cues to

## 2024-08-01 ENCOUNTER — HOSPITAL ENCOUNTER (OUTPATIENT)
Dept: PHYSICAL THERAPY | Age: 49
Setting detail: RECURRING SERIES
Discharge: HOME | End: 2024-08-04
Payer: COMMERCIAL

## 2024-08-01 PROCEDURE — 97110 THERAPEUTIC EXERCISES: CPT

## 2024-08-01 ASSESSMENT — PAIN SCALES - GENERAL: PAINLEVEL_OUTOF10: 0

## 2024-08-01 NOTE — PROGRESS NOTES
\Best Garcia  : 1975  [place]  Secondary:  [facility] [employer] [address] [place] [address]  Phone: [phone]  Fax: [phone] Plan Frequency: Two times a week for 90 days    Plan of Care/Certification Expiration Date: 24             Plan of Care/Certification Expiration Date:  Plan of Care/Certification Expiration Date: 24    Frequency/Duration:   Plan Frequency: Two times a week for 90 days      Time In/Out:   Time In: 1645  Time Out: 1725      PT Visit Info:    Progress Note Due Date: 24  Total # of Visits to Date: 36  Progress Note Counter: 10      Visit Count: 36    OUTPATIENT PHYSICAL THERAPY:   Treatment Note 2024       Episode  (PT-Right ACL repair)               Treatment Diagnosis:    Right knee pain, unspecified chronicity  Difficulty in walking, not elsewhere classified  Medical/Referring Diagnosis:    Rupture of anterior cruciate ligament of right knee, initial encounter  Acute lateral meniscus tear of right knee, initial encounter  Injury of right knee, initial encounter  Referring Physician: Alejandra Govea PA MD Orders: PT Eval and Treat   Return MD Appt: 24  Date of Onset: 3/13/24 (DOS)  Allergies: Patient has no known allergies.  Restrictions/Precautions:   Post Surgical Precautions: Per Dr. Veronica's protocol      Interventions Planned (Treatment may consist of any combination of the following):   See eval note for interventions.    Subjective Comments: Patient reports he's not having any pain this afternoon.    Initial Pain Level:     0/10  Post Session Pain Level:     0/10     Medications Last Reviewed: 2024    Updated Objective Findings: N/A    Treatment     THERAPEUTIC EXERCISE: (40 minutes): Exercises per grid below to improve mobility, strength, and balance. Required minimal visual, verbal and tactile cues to promote proper body alignment, posture and body

## 2024-08-01 NOTE — PROGRESS NOTES
Best Garcia  : 1975  [place]  Secondary:  [facility] [place] [address]  Phone: [phone]  Fax: [phone] Plan Frequency: Two times a week for 90 days  Plan of Care/Certification Expiration Date: 24        Plan of Care/Certification Expiration Date:  Plan of Care/Certification Expiration Date: 24    Frequency/Duration:   Plan Frequency: Two times a week for 90 days      Time In/Out:   Time In: 1645  Time Out: 1725      PT Visit Info:    Progress Note Due Date: 24  Total # of Visits to Date: 36  Progress Note Counter: 10      Visit Count: 36                OUTPATIENT PHYSICAL THERAPY:             Progress Report 2024               Episode (PT-Right ACL repair)         Treatment Diagnosis:     Right knee pain, unspecified chronicity  Difficulty in walking, not elsewhere classified  Medical/Referring Diagnosis:    Rupture of anterior cruciate ligament of right knee, initial encounter  Acute lateral meniscus tear of right knee, initial encounter  Injury of right knee, initial encounter  Referring Physician: Alejandra Govea PA / Kash Veronica MD MD Orders: PT Eval and Treat   Return MD Appt: TBD  Date of Onset:  Onset Date: 24 (Surgery)     Allergies:  Patient has no known allergies.  Restrictions/Precautions:    Post Surgical Precautions: Per Dr. Veronica's protocol      Medications Last Reviewed:  2024     SUBJECTIVE   History of Injury/Illness (Reason for Referral):  Patient reports injuring right knee will snow skiing.  On 2024 patient had right knee arthroscopy with partial lateral meniscectomy/ACL reconstruction with soft tissue allograft.  Patient rates pain level in right knee as 2/10.  Patient ambulates into clinic with crutches-NWB right lower extremity with brace on right knee.      Patient Stated Goal(s): \"To get back to

## 2024-08-06 ENCOUNTER — APPOINTMENT (OUTPATIENT)
Dept: PHYSICAL THERAPY | Age: 49
End: 2024-08-06
Payer: COMMERCIAL

## 2024-08-08 ENCOUNTER — HOSPITAL ENCOUNTER (OUTPATIENT)
Dept: PHYSICAL THERAPY | Age: 49
Setting detail: RECURRING SERIES
Discharge: HOME | End: 2024-08-11
Payer: COMMERCIAL

## 2024-08-08 PROCEDURE — 97110 THERAPEUTIC EXERCISES: CPT

## 2024-08-08 ASSESSMENT — PAIN SCALES - GENERAL: PAINLEVEL_OUTOF10: 0

## 2024-08-08 NOTE — PROGRESS NOTES
tactile cues to promote proper body alignment, posture and body mechanics. Progressed resistance, range and repetitions as indicated.    MANUAL THERAPY: (0 minutes): Joint mobilization, soft tissue mobilization and manipulation was utilized and necessary because of the patient's restricted joint motion, painful spasm, loss of articular motion and restricted motion of soft tissue.      Date: 7/25/24 Date: 7/30/24 Date: 8/1/24 Date: 8/8/24   Activity/Exercise       Patient assessment/education     PN assessment  Single-leg hop trials Calf strengthening  Hop testing   Recumbent bike         Airdyne bike   5 minutes 5 minutes     Bounding     Up and down turf    Knee flexion stretching         Bunny hops in place         Landing mechanics         Broad jumps         Skipping     Up and down turf    Lateral (skater) hops         Bridges         SLR         Kickstand RDLs      1 x 8 each side 20# KB  1 x 8 each side 35# KB   Squats      1 x 10 50# + safety bar  1 x 10 90# + safety bar  1 x 10 140# + safety bar   Stay low lunges      1 x 60 feet  2 x 60 feet with 25# KB   Leg press machine         Knee extension machine         Hamstring curls         Hip adduction machine         Hip abduction machine         TKE         Senait step overs         Sit to stands    2 x 5 each side  Single-leg     Reverse + lateral lunge combo with slider  2 x 5 each side     TRX HS curls with bridge    3 x 8     RDLs         Heel raises     1 x 10 B 80#  1 x 10 B 100#  1 x 10 B 120#    Toe raises         Incline board stretch    1 x 90 sec     Step ups         Lunges         Decline board forward heel taps       Lateral lunges         Straight leg lift overs         Isometric knee extension         [place]    2 x 5 each side with RDL     Prone hamstring curls         Lateral heel taps         Forward heel taps         Split stance isometric holds         Side steps against resistance         Single-leg stance         Y balance

## 2024-08-13 ENCOUNTER — HOSPITAL ENCOUNTER (OUTPATIENT)
Dept: PHYSICAL THERAPY | Age: 49
Setting detail: RECURRING SERIES
Discharge: HOME | End: 2024-08-16
Payer: COMMERCIAL

## 2024-08-13 PROCEDURE — 97110 THERAPEUTIC EXERCISES: CPT

## 2024-08-13 ASSESSMENT — PAIN SCALES - GENERAL: PAINLEVEL_OUTOF10: 0

## 2024-08-13 NOTE — PROGRESS NOTES
D, PT [facility]   8/19/2024  2:50 PM Kash Veronica MD [facility]   8/20/2024  4:45 PM Kp Fishman, PT [facility]   8/22/2024  4:45 PM Kp Fishman, PT [facility]   8/27/2024  4:45 PM Kp Fishman, PT [facility]   8/29/2024  4:45 PM Kp Fishman, PT [facility]   9/3/2024  4:45 PM Kp Fishman, PT [facility]   9/5/2024  4:45 PM Kp Fishman, PT [facility]   9/10/2024  4:45 PM Kp Fishman, PT [facility]   9/12/2024  4:45 PM Kp Fishman, PT [facility]   9/17/2024  4:45 PM Kp Fishman, PT [facility]   9/19/2024  4:45 PM Kp Fishman, PT [facility]   9/24/2024  4:45 PM Kp Fishman, PT [facility]   9/26/2024  4:45 PM Kp Fishman, PT [facility]

## 2024-08-15 ENCOUNTER — HOSPITAL ENCOUNTER (OUTPATIENT)
Dept: PHYSICAL THERAPY | Age: 49
Setting detail: RECURRING SERIES
Discharge: HOME | End: 2024-08-18
Payer: COMMERCIAL

## 2024-08-15 PROCEDURE — 97110 THERAPEUTIC EXERCISES: CPT

## 2024-08-15 ASSESSMENT — PAIN SCALES - GENERAL: PAINLEVEL_OUTOF10: 0

## 2024-08-15 NOTE — PROGRESS NOTES
\Best Garcia  : 1975  [place])  Secondary:  [facility] [employer] [address] [place] [address]  Phone: [phone]  Fax: [phone] Plan Frequency: Two times a week for 90 days    Plan of Care/Certification Expiration Date: 24             Plan of Care/Certification Expiration Date:  Plan of Care/Certification Expiration Date: 24    Frequency/Duration:   Plan Frequency: Two times a week for 90 days      Time In/Out:   Time In: 1655  Time Out: 1735      PT Visit Info:    Progress Note Due Date: 24  Total # of Visits to Date: 39  Progress Note Counter: 3      Visit Count: 39    OUTPATIENT PHYSICAL THERAPY:   Treatment Note 8/15/2024       Episode  (PT-Right ACL repair)               Treatment Diagnosis:    Right knee pain, unspecified chronicity  Difficulty in walking, not elsewhere classified  Medical/Referring Diagnosis:    Rupture of anterior cruciate ligament of right knee, initial encounter  Acute lateral meniscus tear of right knee, initial encounter  Injury of right knee, initial encounter  Referring Physician: Alejandra Govea PA MD Orders: PT Eval and Treat   Return MD Appt: 24  Date of Onset: 3/13/24 (DOS)  Allergies: Patient has no known allergies.  Restrictions/Precautions:   Post Surgical Precautions: Per Dr. Veronica's protocol      Interventions Planned (Treatment may consist of any combination of the following):   See eval note for interventions.    Subjective Comments: Patient reports he was a little sore after last time. He says that single-leg hopping at home doesn't feel good on his knee. He says he's been practicing for his hop tests.    Initial Pain Level:     0/10  Post Session Pain Level:     0/10     Medications Last Reviewed: 8/15/2024    Updated Objective Findings: N/A    Treatment     THERAPEUTIC EXERCISE: (40 minutes): Exercises per grid below to improve mobility,

## 2024-08-20 ENCOUNTER — APPOINTMENT (OUTPATIENT)
Dept: PHYSICAL THERAPY | Age: 49
End: 2024-08-20
Payer: COMMERCIAL

## 2024-08-22 ENCOUNTER — HOSPITAL ENCOUNTER (OUTPATIENT)
Dept: PHYSICAL THERAPY | Age: 49
Setting detail: RECURRING SERIES
Discharge: HOME | End: 2024-08-25
Payer: COMMERCIAL

## 2024-08-22 PROCEDURE — 97110 THERAPEUTIC EXERCISES: CPT

## 2024-08-22 ASSESSMENT — PAIN SCALES - GENERAL: PAINLEVEL_OUTOF10: 0

## 2024-08-22 NOTE — PROGRESS NOTES
\Best Garcia  : 1975  [place]  Secondary:  [facility] [employer] [address] [place] [address]  Phone: [phone]  Fax: [phone] Plan Frequency: Two times a week for 90 days    Plan of Care/Certification Expiration Date: 24             Plan of Care/Certification Expiration Date:  Plan of Care/Certification Expiration Date: 24    Frequency/Duration:   Plan Frequency: Two times a week for 90 days      Time In/Out:   Time In: 1658  Time Out: 1739      PT Visit Info:    Progress Note Due Date: 24  Total # of Visits to Date: 40  Progress Note Counter: 4      Visit Count: 40    OUTPATIENT PHYSICAL THERAPY:   Treatment Note 2024       Episode  (PT-Right ACL repair)               Treatment Diagnosis:    Right knee pain, unspecified chronicity  Difficulty in walking, not elsewhere classified  Medical/Referring Diagnosis:    Rupture of anterior cruciate ligament of right knee, initial encounter  Acute lateral meniscus tear of right knee, initial encounter  Injury of right knee, initial encounter  Referring Physician: Alejandra Govea PA MD Orders: PT Eval and Treat   Return MD Appt: 24  Date of Onset: 3/13/24 (DOS)  Allergies: Patient has no known allergies.  Restrictions/Precautions:   Post Surgical Precautions: Per Dr. Veronica's protocol      Interventions Planned (Treatment may consist of any combination of the following):   See eval note for interventions.    Subjective Comments: Patient reports he was having a little pain throughout the day, but it's feeling better now.    Initial Pain Level:     0/10  Post Session Pain Level:     0/10     Medications Last Reviewed: 2024    Updated Objective Findings: N/A    Treatment     THERAPEUTIC EXERCISE: (41 minutes): Exercises per grid below to improve mobility, strength, and balance. Required minimal visual, verbal and tactile cues to promote  movement. He reports having some knee pain with landing during hop tests as well.  Communication/Consultation: None today  Equipment provided today: None  Recommendations/Intent for next treatment session: Next visit will focus on LE strengthening and knee ROM.    >Total Treatment Billable Duration: 41 minutes  Time In: 1658  Time Out: 1739    Kp Fishman PT, DPT    Charge Capture  [employer] [facility]  Appt Desk  Attendance Report    Future Appointments   Date Time Provider Department Center   8/27/2024  4:45 PM Kp Fishman, PT [facility]   8/29/2024  4:45 PM Kp Fishman, PT [facility]   9/3/2024  4:45 PM Kp Fishman, PT [facility]   9/5/2024  4:45 PM Kp Fishman, PT [facility]   9/10/2024  4:45 PM Kp Fishman, PT [facility]   9/12/2024  4:45 PM Kp Fishman, PT [facility]   9/17/2024  4:45 PM Kp Fishman, PT [facility]   9/19/2024  4:45 PM Kp Fishman, PT [facility]   9/24/2024  4:45 PM Kp Fishman, PT [facility]   9/26/2024  4:45 PM Kp Fishman, PT [facility]   9/30/2024  2:20 PM Kash Veronica MD [facility]

## 2024-08-27 ENCOUNTER — HOSPITAL ENCOUNTER (OUTPATIENT)
Dept: PHYSICAL THERAPY | Age: 49
Setting detail: RECURRING SERIES
Discharge: HOME | End: 2024-08-30
Payer: COMMERCIAL

## 2024-08-27 PROCEDURE — 97110 THERAPEUTIC EXERCISES: CPT

## 2024-08-27 ASSESSMENT — PAIN SCALES - GENERAL: PAINLEVEL_OUTOF10: 1

## 2024-08-27 ASSESSMENT — PAIN DESCRIPTION - LOCATION: LOCATION: KNEE

## 2024-08-27 NOTE — PROGRESS NOTES
\Best Garcia  : 1975  [place]: [place]  Secondary:  [facility] [employer] [address] [place] [address]  Phone: [phone]  Fax: [phone] Plan Frequency: Two times a week for 90 days    Plan of Care/Certification Expiration Date: 24             Plan of Care/Certification Expiration Date:  Plan of Care/Certification Expiration Date: 24    Frequency/Duration:   Plan Frequency: Two times a week for 90 days      Time In/Out:   Time In: 1648  Time Out: 1728      PT Visit Info:    Progress Note Due Date: 24  Total # of Visits to Date: 41  Progress Note Counter: 5      Visit Count: 41    OUTPATIENT PHYSICAL THERAPY:   Treatment Note 2024       Episode  (PT-Right ACL repair)               Treatment Diagnosis:    Right knee pain, unspecified chronicity  Difficulty in walking, not elsewhere classified  Medical/Referring Diagnosis:    Rupture of anterior cruciate ligament of right knee, initial encounter  Acute lateral meniscus tear of right knee, initial encounter  Injury of right knee, initial encounter  Referring Physician: Alejandra Govea PA MD Orders: PT Eval and Treat   Return MD Appt: 24  Date of Onset: 3/13/24 (DOS)  Allergies: Patient has no known allergies.  Restrictions/Precautions:   Post Surgical Precautions: Per Dr. Veronica's protocol      Interventions Planned (Treatment may consist of any combination of the following):   See eval note for interventions.    Subjective Comments: Patient reports he is having a little pain today.    Initial Pain Level:   Knee 1/10  Post Session Pain Level:   Knee 1/10     Medications Last Reviewed: 2024    Updated Objective Findings: N/A    Treatment     THERAPEUTIC EXERCISE: (40 minutes): Exercises per grid below to improve mobility, strength, and balance. Required minimal visual, verbal and tactile cues to promote proper body alignment, posture and body

## 2024-08-29 ENCOUNTER — HOSPITAL ENCOUNTER (OUTPATIENT)
Dept: PHYSICAL THERAPY | Age: 49
Setting detail: RECURRING SERIES
End: 2024-08-29
Payer: COMMERCIAL

## 2024-08-29 PROCEDURE — 97110 THERAPEUTIC EXERCISES: CPT

## 2024-08-29 ASSESSMENT — PAIN DESCRIPTION - LOCATION: LOCATION: KNEE

## 2024-08-29 ASSESSMENT — PAIN SCALES - GENERAL: PAINLEVEL_OUTOF10: 1

## 2024-08-29 NOTE — PROGRESS NOTES
\Best Garcia  : 1975  [place])  Secondary:  [facility] [employer] [place] [address]  Phone: [phone]  Fax: [phone] Plan Frequency: Two times a week for 90 days    Plan of Care/Certification Expiration Date: 24             Plan of Care/Certification Expiration Date:  Plan of Care/Certification Expiration Date: 24    Frequency/Duration:   Plan Frequency: Two times a week for 90 days      Time In/Out:   Time In: 1629  Time Out: 1711      PT Visit Info:    Progress Note Due Date: 24  Total # of Visits to Date: 42  Progress Note Counter: 6      Visit Count: 42    OUTPATIENT PHYSICAL THERAPY:   Treatment Note 2024       Episode  (PT-Right ACL repair)               Treatment Diagnosis:    Right knee pain, unspecified chronicity  Difficulty in walking, not elsewhere classified  Medical/Referring Diagnosis:    Rupture of anterior cruciate ligament of right knee, initial encounter  Acute lateral meniscus tear of right knee, initial encounter  Injury of right knee, initial encounter  Referring Physician: Alejandra Govea PA MD Orders: PT Eval and Treat   Return MD Appt: 24  Date of Onset: 3/13/24 (DOS)  Allergies: Patient has no known allergies.  Restrictions/Precautions:   Post Surgical Precautions: Per Dr. Veronica's protocol      Interventions Planned (Treatment may consist of any combination of the following):   See eval note for interventions.    Subjective Comments: Patient reports he's feeling a little pain in his knee.    Initial Pain Level:   Knee 1/10  Post Session Pain Level:   Knee 1/10     Medications Last Reviewed: 2024    Updated Objective Findings: N/A    Treatment     THERAPEUTIC EXERCISE: (38 minutes): Exercises per grid below to improve mobility, strength, and balance. Required minimal visual, verbal and tactile cues to promote proper body alignment, posture and    Treatment Assessment: Patient tolerated therapy well this visit. His landing mechanics are improving with minimal cues required for having \"soft knees.\" He reports feeling some pain when leaping/hopping from his RLE. He is improving with exercises in therapy each week.  Communication/Consultation: None today  Equipment provided today: None  Recommendations/Intent for next treatment session: Next visit will focus on LE strengthening and knee ROM.    >Total Treatment Billable Duration: 38 minutes  Time In: 1629  Time Out: 1711    Kp Fishman PT, DPT    Charge Capture  [facility]  Appt Desk  Attendance Report    Future Appointments   Date Time Provider Department Center   9/3/2024  4:45 PM Kp Fishman, PT [facility]   9/5/2024  4:45 PM Kp Fishman, PT [facility]   9/10/2024  4:45 PM Kp Fishman, PT [facility]   9/12/2024  4:45 PM Kp Fishman, PT [facility]   9/17/2024  4:45 PM Kp Fishman, PT [facility]   9/19/2024  4:45 PM Kp Fishman, PT [facility]   9/24/2024  4:45 PM Kp Fishman, PT [facility]   9/26/2024  4:45 PM Kp Fishman, PT [facility]   9/30/2024  2:20 PM Kash Veronica MD [facility]      no

## 2024-09-03 ENCOUNTER — HOSPITAL ENCOUNTER (OUTPATIENT)
Dept: PHYSICAL THERAPY | Age: 49
Setting detail: RECURRING SERIES
Discharge: HOME | End: 2024-09-06
Payer: COMMERCIAL

## 2024-09-03 PROCEDURE — 97110 THERAPEUTIC EXERCISES: CPT

## 2024-09-03 ASSESSMENT — PAIN SCALES - GENERAL: PAINLEVEL_OUTOF10: 0

## 2024-09-03 ASSESSMENT — PAIN DESCRIPTION - LOCATION: LOCATION: KNEE

## 2024-09-03 NOTE — PROGRESS NOTES
Prone hamstring curls         Lateral heel taps         Forward heel taps         Split stance isometric holds         Side steps against resistance         Single-leg stance         Y balance         Single-leg cone reaches         Single-leg bridges         \"Stay low\" lunges         Sled push / pull         Side plank clam shells         Pistol box squats   2 x 10 each side      Split squats         TKE step ups         TKE forward heel taps         Curtsy lunges         Wall sits         Nina ropes         Quadruped knee flexion         Lunge for knee flexion         Seated passive knee flexion with contract-relax & traction       Stair navigation knee flexion and extension (up/down)       Seated on ball knee flexion         Prone knee flexion with strap         Assisted [place] HS curls         Bridge HS curls with sliders         Front foot elevated reverse lunges into step ups       1.5 goblet squats         Rear foot elevated split stance rockers       Banded monster walks         Barbell RDLs         Split stance pulses with hold         Reverse lunges with foam roller cue for knees over toes       Single-leg bridges with feet elevated       Retro sled walking         Eccentric reverse step downs         Rear foot elevated split squats       Banded eccentric forward heel taps  2 x 10 each side 8\" step  Blue tubing at knee     SLS around the world with ball       3-way reach off step - goblet hold   2 x 5 each side on 6\" step  35# KB    Deficit split squats         TRX skater squat to pistol squat       Decline board heel taps   2 x 10 each side      Trampoline hops    Jump on 2, land on 1  Painful with SL only     Lateral to curtsy lunge combo - goblet hold  2 x 6 each side 20# KB     Nordic hamstring curl machine  1 x 10 with PT assist       Treatment/Session Summary:    Treatment Assessment: Patient tolerated therapy well this afternoon. He did well with new exercises today and tolerated plyometrics

## 2024-09-05 ENCOUNTER — HOSPITAL ENCOUNTER (OUTPATIENT)
Dept: PHYSICAL THERAPY | Age: 49
Setting detail: RECURRING SERIES
Discharge: HOME | End: 2024-09-08
Payer: COMMERCIAL

## 2024-09-05 PROCEDURE — 97110 THERAPEUTIC EXERCISES: CPT

## 2024-09-05 ASSESSMENT — PAIN DESCRIPTION - LOCATION: LOCATION: KNEE

## 2024-09-05 ASSESSMENT — PAIN SCALES - GENERAL: PAINLEVEL_OUTOF10: 0

## 2024-09-10 ENCOUNTER — HOSPITAL ENCOUNTER (OUTPATIENT)
Dept: PHYSICAL THERAPY | Age: 49
Setting detail: RECURRING SERIES
Discharge: HOME | End: 2024-09-13
Payer: COMMERCIAL

## 2024-09-10 PROCEDURE — 97110 THERAPEUTIC EXERCISES: CPT

## 2024-09-10 ASSESSMENT — PAIN SCALES - GENERAL: PAINLEVEL_OUTOF10: 0

## 2024-09-10 ASSESSMENT — PAIN DESCRIPTION - LOCATION: LOCATION: KNEE

## 2024-09-12 ENCOUNTER — HOSPITAL ENCOUNTER (OUTPATIENT)
Dept: PHYSICAL THERAPY | Age: 49
Setting detail: RECURRING SERIES
Discharge: HOME | End: 2024-09-15
Payer: COMMERCIAL

## 2024-09-12 PROCEDURE — 97110 THERAPEUTIC EXERCISES: CPT

## 2024-09-12 ASSESSMENT — PAIN SCALES - GENERAL: PAINLEVEL_OUTOF10: 1

## 2024-09-12 ASSESSMENT — PAIN DESCRIPTION - LOCATION: LOCATION: KNEE

## 2024-09-17 ENCOUNTER — HOSPITAL ENCOUNTER (OUTPATIENT)
Dept: PHYSICAL THERAPY | Age: 49
Setting detail: RECURRING SERIES
Discharge: HOME | End: 2024-09-20
Payer: COMMERCIAL

## 2024-09-17 PROCEDURE — 97110 THERAPEUTIC EXERCISES: CPT

## 2024-09-17 ASSESSMENT — PAIN SCALES - GENERAL: PAINLEVEL_OUTOF10: 1

## 2024-09-17 ASSESSMENT — PAIN DESCRIPTION - LOCATION: LOCATION: KNEE

## 2024-09-19 ENCOUNTER — HOSPITAL ENCOUNTER (OUTPATIENT)
Dept: PHYSICAL THERAPY | Age: 49
Setting detail: RECURRING SERIES
Discharge: HOME | End: 2024-09-22
Payer: COMMERCIAL

## 2024-09-19 PROCEDURE — 97110 THERAPEUTIC EXERCISES: CPT

## 2024-09-19 ASSESSMENT — PAIN SCALES - GENERAL: PAINLEVEL_OUTOF10: 1

## 2024-09-19 ASSESSMENT — PAIN DESCRIPTION - LOCATION: LOCATION: KNEE

## 2024-09-24 ENCOUNTER — HOSPITAL ENCOUNTER (OUTPATIENT)
Dept: PHYSICAL THERAPY | Age: 49
Setting detail: RECURRING SERIES
Discharge: HOME | End: 2024-09-27
Payer: COMMERCIAL

## 2024-09-24 PROCEDURE — 97110 THERAPEUTIC EXERCISES: CPT

## 2024-09-24 ASSESSMENT — PAIN SCALES - GENERAL: PAINLEVEL_OUTOF10: 1

## 2024-09-26 ENCOUNTER — APPOINTMENT (OUTPATIENT)
Dept: PHYSICAL THERAPY | Age: 49
End: 2024-09-26
Payer: COMMERCIAL

## 2024-10-01 ENCOUNTER — APPOINTMENT (OUTPATIENT)
Dept: PHYSICAL THERAPY | Age: 49
End: 2024-10-01
Payer: COMMERCIAL

## 2024-10-03 ENCOUNTER — APPOINTMENT (OUTPATIENT)
Dept: PHYSICAL THERAPY | Age: 49
End: 2024-10-03
Payer: COMMERCIAL

## 2024-10-08 ENCOUNTER — HOSPITAL ENCOUNTER (OUTPATIENT)
Dept: PHYSICAL THERAPY | Age: 49
Setting detail: RECURRING SERIES
Discharge: HOME | End: 2024-10-11
Payer: COMMERCIAL

## 2024-10-08 PROCEDURE — 97110 THERAPEUTIC EXERCISES: CPT

## 2024-10-08 ASSESSMENT — PAIN SCALES - GENERAL: PAINLEVEL_OUTOF10: 0

## 2024-10-08 ASSESSMENT — PAIN DESCRIPTION - LOCATION: LOCATION: KNEE

## 2024-10-08 NOTE — PROGRESS NOTES
Best Garcia  : 1975  [place]: [place] [employer])  Secondary:  [facility] [place] [address]  Phone: [phone]  Fax: [phone] Plan Frequency: Two times a week for 90 days    Plan of Care/Certification Expiration Date: 24             Plan of Care/Certification Expiration Date:  Plan of Care/Certification Expiration Date: 24    Frequency/Duration:   Plan Frequency: Two times a week for 90 days      Time In/Out:   Time In: 1647  Time Out: 1727      PT Visit Info:    Progress Note Due Date: 24  Total # of Visits to Date: 50  Progress Note Counter: 3      Visit Count: 50    OUTPATIENT PHYSICAL THERAPY:   Treatment Note 10/8/2024       Episode  (PT-Right ACL repair)               Treatment Diagnosis:    Right knee pain, unspecified chronicity  Difficulty in walking, not elsewhere classified  Medical/Referring Diagnosis:    Rupture of anterior cruciate ligament of right knee, initial encounter  Acute lateral meniscus tear of right knee, initial encounter  Injury of right knee, initial encounter  Referring Physician: Alejandra Govea PA MD Orders: PT Eval and Treat   Return MD Appt: 24  Date of Onset: 3/13/24 (DOS)  Allergies: Patient has no known allergies.  Restrictions/Precautions:   Post Surgical Precautions: Per Dr. Veronica's protocol      Interventions Planned (Treatment may consist of any combination of the following):   See eval note for interventions.    Subjective Comments: Patient reports he is not having any pain today upon arrival.    Initial Pain Level:   Knee 0/10  Post Session Pain Level:   Knee 0/10     Medications Last Reviewed: 10/8/2024    Updated Objective Findings:   Forward: 21 in R, 22 in L  PM: 33 in R, 32 in L  PL: 32 in R, 33 in L    Treatment     THERAPEUTIC EXERCISE: (38 minutes): Exercises per grid below to improve mobility, strength, and balance. Required minimal

## 2024-10-10 ENCOUNTER — HOSPITAL ENCOUNTER (OUTPATIENT)
Dept: PHYSICAL THERAPY | Age: 49
Setting detail: RECURRING SERIES
Discharge: HOME | End: 2024-10-13
Payer: COMMERCIAL

## 2024-10-10 PROCEDURE — 97110 THERAPEUTIC EXERCISES: CPT

## 2024-10-10 ASSESSMENT — PAIN DESCRIPTION - LOCATION: LOCATION: KNEE

## 2024-10-10 ASSESSMENT — PAIN SCALES - GENERAL: PAINLEVEL_OUTOF10: 0

## 2024-10-10 NOTE — PROGRESS NOTES
Best Garcia  : 1975  [place]  Secondary:  [facility] [place] [address]  Phone: [phone]  Fax: [phone] Plan Frequency: Two times a week for 90 days    Plan of Care/Certification Expiration Date: 24             Plan of Care/Certification Expiration Date:  Plan of Care/Certification Expiration Date: 24    Frequency/Duration:   Plan Frequency: Two times a week for 90 days      Time In/Out:   Time In: 1649  Time Out: 1727      PT Visit Info:    Progress Note Due Date: 24  Total # of Visits to Date: 51  Progress Note Counter: 4      Visit Count: 51    OUTPATIENT PHYSICAL THERAPY:   Treatment Note 10/10/2024       Episode  (PT-Right ACL repair)               Treatment Diagnosis:    Right knee pain, unspecified chronicity  Difficulty in walking, not elsewhere classified  Medical/Referring Diagnosis:    Rupture of anterior cruciate ligament of right knee, initial encounter  Acute lateral meniscus tear of right knee, initial encounter  Injury of right knee, initial encounter  Referring Physician: Alejandra Govea PA MD Orders: PT Eval and Treat   Return MD Appt: 24  Date of Onset: 3/13/24 (DOS)  Allergies: Patient has no known allergies.  Restrictions/Precautions:   Post Surgical Precautions: Per Dr. Veronica's protocol      Interventions Planned (Treatment may consist of any combination of the following):   See eval note for interventions.    Subjective Comments: Patient reports he is not having any pain.    Initial Pain Level:   Knee 0/10  Post Session Pain Level:   Knee 0/10     Medications Last Reviewed: 10/10/2024    Updated Objective Findings:   Forward: 21 in R, 22 in L  PM: 33 in R, 32 in L  PL: 32 in R, 33 in L    Treatment     THERAPEUTIC EXERCISE: (38 minutes): Exercises per grid below to improve mobility, strength, and balance. Required minimal visual, verbal and

## 2024-10-15 ENCOUNTER — HOSPITAL ENCOUNTER (OUTPATIENT)
Dept: PHYSICAL THERAPY | Age: 49
Setting detail: RECURRING SERIES
Discharge: HOME | End: 2024-10-18
Payer: COMMERCIAL

## 2024-10-15 PROCEDURE — 97110 THERAPEUTIC EXERCISES: CPT

## 2024-10-15 ASSESSMENT — PAIN SCALES - GENERAL: PAINLEVEL_OUTOF10: 1

## 2024-10-15 ASSESSMENT — PAIN DESCRIPTION - LOCATION: LOCATION: KNEE

## 2024-10-17 ENCOUNTER — APPOINTMENT (OUTPATIENT)
Dept: PHYSICAL THERAPY | Age: 49
End: 2024-10-17
Payer: COMMERCIAL

## 2024-10-22 ENCOUNTER — APPOINTMENT (OUTPATIENT)
Dept: PHYSICAL THERAPY | Age: 49
End: 2024-10-22
Payer: COMMERCIAL

## 2024-10-24 ENCOUNTER — HOSPITAL ENCOUNTER (OUTPATIENT)
Dept: PHYSICAL THERAPY | Age: 49
Setting detail: RECURRING SERIES
Discharge: HOME | End: 2024-10-27
Payer: COMMERCIAL

## 2024-10-24 PROCEDURE — 97110 THERAPEUTIC EXERCISES: CPT

## 2024-10-24 ASSESSMENT — PAIN SCALES - GENERAL: PAINLEVEL_OUTOF10: 0

## 2024-10-24 NOTE — PROGRESS NOTES
Best SUKHI Jose  : 1975  [place]  Secondary:  [facility] [employer] [place] [address]  Phone: [phone]  Fax: [phone] Plan Frequency: Two times a week for 90 days    Plan of Care/Certification Expiration Date: 24             Plan of Care/Certification Expiration Date:  Plan of Care/Certification Expiration Date: 24    Frequency/Duration:   Plan Frequency: Two times a week for 90 days      Time In/Out:   Time In: 446  Time Out: 524      PT Visit Info:    Progress Note Due Date: 24  Total # of Visits to Date: 53  Progress Note Counter: 6      Visit Count: 53    OUTPATIENT PHYSICAL THERAPY:   Treatment Note 10/24/2024       Episode  (PT-Right ACL repair)               Treatment Diagnosis:    Right knee pain, unspecified chronicity  Difficulty in walking, not elsewhere classified  Medical/Referring Diagnosis:    Rupture of anterior cruciate ligament of right knee, initial encounter  Acute lateral meniscus tear of right knee, initial encounter  Injury of right knee, initial encounter  Referring Physician: Alejandra Govea PA MD Orders: PT Eval and Treat   Return MD Appt: 24  Date of Onset: 3/13/24 (DOS)  Allergies: Patient has no known allergies.  Restrictions/Precautions:   Post Surgical Precautions: Per Dr. Veronica's protocol      Interventions Planned (Treatment may consist of any combination of the following):   See eval note for interventions.    Subjective Comments: Patient reports he's not having pain this afternoon.    Initial Pain Level:     0/10  Post Session Pain Level:     0/10     Medications Last Reviewed: 10/24/2024    Updated Objective Findings:   Forward: 21 in R, 22 in L  PM: 33 in R, 32 in L  PL: 32 in R, 33 in L    Treatment     THERAPEUTIC EXERCISE: (38 minutes): Exercises per grid below to improve mobility, strength, and balance. Required minimal visual, verbal and

## 2024-10-29 ENCOUNTER — APPOINTMENT (OUTPATIENT)
Dept: PHYSICAL THERAPY | Age: 49
End: 2024-10-29
Payer: COMMERCIAL

## 2024-10-31 ENCOUNTER — APPOINTMENT (OUTPATIENT)
Dept: PHYSICAL THERAPY | Age: 49
End: 2024-10-31
Payer: COMMERCIAL

## 2024-11-05 ENCOUNTER — HOSPITAL ENCOUNTER (OUTPATIENT)
Dept: PHYSICAL THERAPY | Age: 49
Setting detail: RECURRING SERIES
Discharge: HOME | End: 2024-11-08
Payer: COMMERCIAL

## 2024-11-05 PROCEDURE — 97110 THERAPEUTIC EXERCISES: CPT

## 2024-11-05 ASSESSMENT — PAIN SCALES - GENERAL: PAINLEVEL_OUTOF10: 0

## 2024-11-05 NOTE — PROGRESS NOTES
Best SUKHI Jose  : 1975  [place]  Secondary:  [facility] [employer] [place] [address]  Phone: [phone]  Fax: [phone] Plan Frequency: Two times a week for 90 days    Plan of Care/Certification Expiration Date: 24             Plan of Care/Certification Expiration Date:  Plan of Care/Certification Expiration Date: 24    Frequency/Duration:   Plan Frequency: Two times a week for 90 days      Time In/Out:   Time In: 1643  Time Out: 1724      PT Visit Info:    Progress Note Due Date: 24  Total # of Visits to Date: 54  Progress Note Counter: 7      Visit Count: 54    OUTPATIENT PHYSICAL THERAPY:   Treatment Note 2024       Episode  (PT-Right ACL repair)               Treatment Diagnosis:    Right knee pain, unspecified chronicity  Difficulty in walking, not elsewhere classified  Medical/Referring Diagnosis:    Rupture of anterior cruciate ligament of right knee, initial encounter  Acute lateral meniscus tear of right knee, initial encounter  Injury of right knee, initial encounter  Referring Physician: Alejandra Govea PA MD Orders: PT Eval and Treat   Return MD Appt: 24  Date of Onset: 3/13/24 (DOS)  Allergies: Patient has no known allergies.  Restrictions/Precautions:   Post Surgical Precautions: Per Dr. Veronica's protocol      Interventions Planned (Treatment may consist of any combination of the following):   See eval note for interventions.    Subjective Comments: Patient reports he was able to go on a long hike this weekend. He said he could feel some discomfort when he had to come back down the mountain.    Initial Pain Level:     0/10  Post Session Pain Level:     0/10     Medications Last Reviewed: 2024    Updated Objective Findings:   Forward: 21 in R, 22 in L  PM: 33 in R, 32 in L  PL: 32 in R, 33 in L    Treatment     THERAPEUTIC EXERCISE: (40 minutes): Exercises per

## 2024-11-07 ENCOUNTER — APPOINTMENT (OUTPATIENT)
Dept: PHYSICAL THERAPY | Age: 49
End: 2024-11-07
Payer: COMMERCIAL

## 2024-11-08 NOTE — PROGRESS NOTES
Patient Name: Best Garcia  Date: 11/5/2024  Surgery Date: 3/13/24  Procedure: R knee arthroscopy w/ partial lateral menis/ACL recon  Month: 6  Clinic: [facility]  Lead Therapist: Kp Fishman, BREANA  Sport:  ACL-R Return to Sport Guidelines       Phase     Return to Running [x]     Return to Participation []     Return to Sport []     Discharge []       *Completed [x]                                   ROM Right Left Diff +/- Passed Goal   Extension -2 -3 1 [x] Within 2 degrees of contralateral side   Flexion 128 138 10 [] Within 5 degrees of contralateral side       LSI Right     (lbs) Left  (lbs) % P/F   Quad (Q) 108.5 108.33 100.2% PASS   Hamstring (H) 73.33 70.83 96.59% PASS   Q-H ratio 1.48 1.53     Goal RTP: 85% Discharge: 95% or higher    ACL-RSI Score: 76   Goal RTP:70%; Discharge 80%    Functional Testing   Right   Left PASS/  FAIL   Goal   Y balance A: 21  PM: 33  PL: 32 A: 22  PM: 32  PL: 33   PASS   Equal to non op knee to <4 cm   SL Squat   X   X   PASS 60-30 degrees  No knee valgus  No excessive forward trunk lean    Drop Jump   (DL to SL)    Looking for Dynamic Knee Valgus, trunk hip and knee strategy with landing   [place] Sport Cord        []   [place] Sport cord Test: >46 passing   Return to Participation Time frame: 5-7 months >80% of non op LE;   Discharge: 8-12 months >90% of non op LE    We are currently working on landing mechanics with hopping & jumping. He's shown improvements with bounding & skipping motions in preparation for refining those skills. He demonstrates improving distances side to side, but has trouble sticking the landing on his RLE. I provided him a return to jogging/running program, but am not sure how consistently he is performing it outside of therapy.      Hop Testing   Right  (In)   Left  (In)   % Within Passing Range    SL hop    []   Crossover    []   Triple    []   Lateral     []   30s side hop test (40 cm apart) X reps X reps  []   Vertical    []

## 2024-11-11 ENCOUNTER — OFFICE VISIT (OUTPATIENT)
Dept: ORTHOPEDIC SURGERY | Age: 49
End: 2024-11-11
Payer: COMMERCIAL

## 2024-11-11 DIAGNOSIS — Z09 S/P ORTHOPEDIC SURGERY, FOLLOW-UP EXAM: Primary | ICD-10-CM

## 2024-11-11 PROCEDURE — 99213 OFFICE O/P EST LOW 20 MIN: CPT | Performed by: ORTHOPAEDIC SURGERY

## 2024-11-11 NOTE — PROGRESS NOTES
Name: Best Garcia  YOB: 1975  Gender: male  MRN: [id number]      CC: Knee Pain (R)       HPI: Best Garcia is a 49 y.o. male who returns for follow up on his right knee status post allograft ACL reconstruction in March.        Physical Examination:  General: no acute distress  Lungs: breathing easily  CV: regular rhythm by pulse  Right Knee: Full extension lacks just a few degrees of flexion solid endpoint is Lachman's minimal translation        Assessment:     ICD-10-CM    1. S/P orthopedic surgery, follow-up exam  Z09           Plan:   Progressing very well in strength Numbers are good continue plyometric activity and running progression in physical therapy follow-up with me 1 final time in 2 to 3 months            Kash Veronica MD, FAAOS  Orthopaedics and Sports Medicine

## 2024-11-12 ENCOUNTER — APPOINTMENT (OUTPATIENT)
Dept: PHYSICAL THERAPY | Age: 49
End: 2024-11-12
Payer: COMMERCIAL

## 2024-11-14 ENCOUNTER — HOSPITAL ENCOUNTER (OUTPATIENT)
Dept: PHYSICAL THERAPY | Age: 49
Setting detail: RECURRING SERIES
Discharge: HOME | End: 2024-11-17
Payer: COMMERCIAL

## 2024-11-14 PROCEDURE — 97110 THERAPEUTIC EXERCISES: CPT

## 2024-11-14 ASSESSMENT — PAIN SCALES - GENERAL: PAINLEVEL_OUTOF10: 0

## 2024-11-14 NOTE — PROGRESS NOTES
Best SUKHI Jose  : 1975  [place]  Secondary:  [facility] [place] [address]  Phone: [phone]  Fax: [phone] Plan Frequency: Two times a week for 90 days    Plan of Care/Certification Expiration Date: 24             Plan of Care/Certification Expiration Date:  Plan of Care/Certification Expiration Date: 24    Frequency/Duration:   Plan Frequency: Two times a week for 90 days      Time In/Out:   Time In: 1649  Time Out: 1727      PT Visit Info:    Progress Note Due Date: 24  Total # of Visits to Date: 55  Progress Note Counter: 8      Visit Count: 55    OUTPATIENT PHYSICAL THERAPY:   Treatment Note 2024       Episode  (PT-Right ACL repair)               Treatment Diagnosis:    Right knee pain, unspecified chronicity  Difficulty in walking, not elsewhere classified  Medical/Referring Diagnosis:    Rupture of anterior cruciate ligament of right knee, initial encounter  Acute lateral meniscus tear of right knee, initial encounter  Injury of right knee, initial encounter  Referring Physician: Alejandra Govea PA MD Orders: PT Eval and Treat   Return MD Appt: 24  Date of Onset: 3/13/24 (DOS)  Allergies: Patient has no known allergies.  Restrictions/Precautions:   Post Surgical Precautions: Per Dr. Veronica's protocol      Interventions Planned (Treatment may consist of any combination of the following):   See eval note for interventions.    Subjective Comments: Patient reports he had a good follow-up with Dr. Veronica.    Initial Pain Level:     0/10  Post Session Pain Level:     0/10     Medications Last Reviewed: 2024    Updated Objective Findings:   Forward: 21 in R, 22 in L  PM: 33 in R, 32 in L  PL: 32 in R, 33 in L    Treatment     THERAPEUTIC EXERCISE: (38 minutes): Exercises per grid below to improve mobility, strength, and balance. Required minimal visual, verbal

## 2024-11-19 ENCOUNTER — APPOINTMENT (OUTPATIENT)
Dept: PHYSICAL THERAPY | Age: 49
End: 2024-11-19
Payer: COMMERCIAL

## 2024-11-21 ENCOUNTER — HOSPITAL ENCOUNTER (OUTPATIENT)
Dept: PHYSICAL THERAPY | Age: 49
Setting detail: RECURRING SERIES
Discharge: HOME | End: 2024-11-24
Payer: COMMERCIAL

## 2024-11-21 PROCEDURE — 97110 THERAPEUTIC EXERCISES: CPT

## 2024-11-21 ASSESSMENT — PAIN SCALES - GENERAL: PAINLEVEL_OUTOF10: 0

## 2024-11-21 NOTE — PROGRESS NOTES
squats       Banded eccentric forward heel taps       SLS around the world with ball       3-way reach off step - goblet hold       Deficit split squats         TRX skater squat to pistol squat       Decline board heel taps         Trampoline hops         Lateral to curtsy lunge combo - goblet hold       Nordic hamstring curl  machine       Decline board single-leg squats       KB pass between split stance with heels raised       Pulse squats with heels raised       Split squat pulses         KB deadlifts         Med ball deceleration lunges    2 x 8 each side 12# med ball     Lateral bound to Bosu ball      1 x 8 each side     Treatment/Session Summary:    Treatment Assessment: Patient tolerated therapy well this afternoon. PT focusing on patient's ability to get a positive shin angle and loading knees over toes. He denied reports of pain today with activities.   Communication/Consultation: None today  Equipment provided today: None  Recommendations/Intent for next treatment session: Next visit will focus on LE strengthening and plyometrics.    >Total Treatment Billable Duration: 38 minutes  Time In: 1652  Time Out: 1730    Kp Fishman PT, DPT    Charge Capture  [facility] Portal  Appt Desk  Attendance Report    Future Appointments   Date Time Provider Department Center   11/26/2024  4:45 PM Kp Fishman, PT [facility]   12/3/2024  4:45 PM Kp Fishman, PT [facility]   12/5/2024  4:45 PM Kp Fishman, PT [facility]   12/10/2024  4:45 PM Kp Fishman, PT [facility]   12/17/2024  4:45 PM Kp Fishman, PT [facility]   12/19/2024  4:45 PM Kp Fishman, PT [facility]   1/2/2025  4:45 PM Kp Fishman, PT [facility]   1/7/2025  4:45 PM Kp Fishman, PT [facility]   1/9/2025  4:45 PM Kp Fishman, PT [facility]   2/17/2025  3:00 PM Kash Veronica MD [facility]

## 2024-11-26 ENCOUNTER — APPOINTMENT (OUTPATIENT)
Dept: PHYSICAL THERAPY | Age: 49
End: 2024-11-26
Payer: COMMERCIAL

## 2024-12-03 ENCOUNTER — APPOINTMENT (OUTPATIENT)
Dept: PHYSICAL THERAPY | Age: 49
End: 2024-12-03
Payer: COMMERCIAL

## 2024-12-05 ENCOUNTER — HOSPITAL ENCOUNTER (OUTPATIENT)
Dept: PHYSICAL THERAPY | Age: 49
Setting detail: RECURRING SERIES
Discharge: HOME | End: 2024-12-08
Payer: COMMERCIAL

## 2024-12-05 PROCEDURE — 97110 THERAPEUTIC EXERCISES: CPT

## 2024-12-05 ASSESSMENT — PAIN SCALES - GENERAL: PAINLEVEL_OUTOF10: 0

## 2024-12-05 NOTE — PROGRESS NOTES
Best Garcia  : 1975  [place] ([place] [facility])  Secondary:  [facility] [address]   [place] [address]  Phone: [phone]  Fax: [phone] Plan Frequency: Two times a week for 90 days  Plan of Care/Certification Expiration Date: 25        Plan of Care/Certification Expiration Date:  Plan of Care/Certification Expiration Date: 25    Frequency/Duration:   Plan Frequency: Two times a week for 90 days      Time In/Out:   Time In: 1650  Time Out: 1730      PT Visit Info:    Progress Note Due Date: 24  Total # of Visits to Date: 57  Progress Note Counter: 10      Visit Count: 57                OUTPATIENT PHYSICAL THERAPY:             Recertification 2024               Episode (PT-Right ACL repair)         Treatment Diagnosis:     Right knee pain, unspecified chronicity  Difficulty in walking, not elsewhere classified  Medical/Referring Diagnosis:    Rupture of anterior cruciate ligament of right knee, initial encounter  Acute lateral meniscus tear of right knee, initial encounter  Injury of right knee, initial encounter  Referring Physician: Alejandra Govea PA / Kash Veronica MD MD Orders: PT Eval and Treat   Return MD Appt: TBD  Date of Onset:  Onset Date: 24 (Surgery)     Allergies:  Patient has no known allergies.  Restrictions/Precautions:    Post Surgical Precautions: Per Dr. Veronica's protocol      Medications Last Reviewed:  2024     SUBJECTIVE   History of Injury/Illness (Reason for Referral):  Patient reports injuring right knee will snow skiing.  On 2024 patient had right knee arthroscopy with partial lateral meniscectomy/ACL reconstruction with soft tissue allograft.  Patient rates pain level in right knee as 2/10.  Patient ambulates into clinic with crutches-NWB right lower extremity with brace on right knee.    Patient Stated Goal(s): \"To get back to cycling\".    Initial Pain 
promote proper body alignment, posture and body mechanics. Progressed resistance, range and repetitions as indicated.    MANUAL THERAPY: (0 minutes): Joint mobilization, soft tissue mobilization and manipulation was utilized and necessary because of the patient's restricted joint motion, painful spasm, loss of articular motion and restricted motion of soft tissue.      Date: 11/5/24 Date: 11/14/24 Date: 11/21/24 Date: 12/5/24   Activity/Exercise       Patient assessment/education      Recertification / progress note   Agility         Airdyne bike   5 minutes  5 minutes Prior to session   Bounding   1 x 100 feet 45 deg 2 x 10 each side; lateral  12# med ball in hands 45 deg with stick  1 x 100 feet 45 deg with stick & continuous   Skipping    1 x 100' for height  1 x 100' for distance     Jogging         Lunge push-backs         Knee flexion stretching         Landing mechanics         Jumping   2 x 15 split stance (same)  Cues for knee over toes 2 x 8 split stance (alternating)  Cues for knee over toes 1 x 100 feet broad jump  Cues for soft knees Leapfrog hops   Hopping      Hop testing for single- and triple- hops   Bunny hops         Drop jump     From 24\" box    Depth jump   2 x 5 on 8\" box  In front of half wall  From 24\" box over nima    RFE drop catches   2 x 10 each side  RFE on 45 deg machine      Lateral box step off to push back       Jumping onto box         High jump into single-leg landing       Squat jumps     1 x 8 with trap bar  1 x 8 with trap bar + 20#    Side steps against resistance band       Split stance jumps         Lateral (skater) hops         Bridges         SLS on balance disk         Kickstand RDLs         Squats    1 x 8 45#  1 x 8 135#  1 x 7 185#     Lunges      Throughout session   Lunge push-backs         Leg press machine         Knee extension machine    1 x 10 B 60#  1 x 10 each SL 30  1 x 10 R 40#  1 x 6 R 70#   Hamstring curls    1 x 10 B 70#  2 x 10 each SL 40# 1 x 10 B

## 2024-12-10 ENCOUNTER — HOSPITAL ENCOUNTER (OUTPATIENT)
Dept: PHYSICAL THERAPY | Age: 49
Setting detail: RECURRING SERIES
Discharge: HOME | End: 2024-12-13
Payer: COMMERCIAL

## 2024-12-10 PROCEDURE — 97110 THERAPEUTIC EXERCISES: CPT

## 2024-12-10 ASSESSMENT — PAIN SCALES - GENERAL: PAINLEVEL_OUTOF10: 0

## 2024-12-10 NOTE — PROGRESS NOTES
Best SUKHI Jose  : 1975  [place]  Secondary:  [facility] [place] [address]  Phone: [phone]  Fax: [phone] Plan Frequency: Two times a week for 90 days    Plan of Care/Certification Expiration Date: 25             Plan of Care/Certification Expiration Date:  Plan of Care/Certification Expiration Date: 25    Frequency/Duration:   Plan Frequency: Two times a week for 90 days      Time In/Out:   Time In: 1652  Time Out: 1731      PT Visit Info:    Progress Note Due Date: 24  Total # of Visits to Date: 58  Progress Note Counter: 1      Visit Count: 58    OUTPATIENT PHYSICAL THERAPY:   Treatment Note 12/10/2024       Episode  (PT-Right ACL repair)               Treatment Diagnosis:    Right knee pain, unspecified chronicity  Difficulty in walking, not elsewhere classified  Medical/Referring Diagnosis:    Rupture of anterior cruciate ligament of right knee, initial encounter  Acute lateral meniscus tear of right knee, initial encounter  Injury of right knee, initial encounter  Referring Physician: Alejandra Govea PA MD Orders: PT Eval and Treat   Return MD Appt: 24  Date of Onset: 3/13/24 (DOS)  Allergies: Patient has no known allergies.  Restrictions/Precautions:   Post Surgical Precautions: Per Dr. Veronica's protocol      Interventions Planned (Treatment may consist of any combination of the following):   See eval note for interventions.    Subjective Comments: Patient says he's feeling good today.    Initial Pain Level:     0/10  Post Session Pain Level:     0/10     Medications Last Reviewed: 12/10/2024    Updated Objective Findings:   Forward: 21 in R, 22 in L  PM: 33 in R, 32 in L  PL: 32 in R, 33 in L    Treatment     THERAPEUTIC EXERCISE: (39 minutes): Exercises per grid below to improve mobility, strength, and balance. Required minimal visual, verbal and tactile cues to

## 2024-12-17 ENCOUNTER — APPOINTMENT (OUTPATIENT)
Dept: PHYSICAL THERAPY | Age: 49
End: 2024-12-17
Payer: COMMERCIAL

## 2024-12-19 ENCOUNTER — APPOINTMENT (OUTPATIENT)
Dept: PHYSICAL THERAPY | Age: 49
End: 2024-12-19
Payer: COMMERCIAL

## 2025-01-02 ENCOUNTER — HOSPITAL ENCOUNTER (OUTPATIENT)
Dept: PHYSICAL THERAPY | Age: 50
Setting detail: RECURRING SERIES
Discharge: HOME | End: 2025-01-05
Payer: COMMERCIAL

## 2025-01-02 PROCEDURE — 97110 THERAPEUTIC EXERCISES: CPT

## 2025-01-02 ASSESSMENT — PAIN SCALES - GENERAL: PAINLEVEL_OUTOF10: 0

## 2025-01-02 NOTE — PROGRESS NOTES
board heel taps         Trampoline hops         Lateral to curtsy lunge combo - goblet hold       Nordic hamstring curl  machine       Decline board single-leg squats       KB pass between split stance with heels raised       Pulse squats with heels raised       Split squat pulses         KB deadlifts         Med ball deceleration lunges         Lateral bound to Bosu ball   1 x 8 each side      Single-leg squats on decline board  Each side     Trap bar deadlifts     1 x 5 100# RPE 7  1 x 5 130# RPE 8  1 x 5 150# RPE 9    Deficit single-leg bouncing - split stance    2 x 10 each side  3 plates under 1 foot   Deep leap frog bounces    2 x 5       Treatment/Session Summary:    Treatment Assessment: Patient tolerated therapy well this visit. He continues to have a little difficulty with going into deep ranges of knee flexion during landing. PT encouraged him to continue with gym strengthening exercises outside of therapy.  Communication/Consultation: None today  Equipment provided today: None  Recommendations/Intent for next treatment session: Next visit will focus on LE strengthening and plyometrics.    >Total Treatment Billable Duration: 38 minutes  Time In: 1650  Time Out: 1728    Kp Fishman PT, DPT    Charge Capture  Zuki Portal  Appt Desk  Attendance Report    Future Appointments   Date Time Provider Department Center   1/9/2025  4:45 PM Kp Fishman, PT [facility]   1/16/2025  4:45 PM Kp Fishman, PT [facility]   1/23/2025  4:45 PM Kp Fishman, PT [facility]   1/30/2025  4:45 PM Kp Fishman, PT [facility]   2/6/2025  4:45 PM Kp Fishman, PT [facility]   2/13/2025  4:45 PM Kp Fishman, PT [facility]   2/17/2025  3:00 PM Kash Veronica MD [facility]

## 2025-01-07 ENCOUNTER — APPOINTMENT (OUTPATIENT)
Dept: PHYSICAL THERAPY | Age: 50
End: 2025-01-07
Payer: COMMERCIAL

## 2025-01-09 ENCOUNTER — HOSPITAL ENCOUNTER (OUTPATIENT)
Dept: PHYSICAL THERAPY | Age: 50
Setting detail: RECURRING SERIES
Discharge: HOME | End: 2025-01-12
Payer: COMMERCIAL

## 2025-01-09 PROCEDURE — 97110 THERAPEUTIC EXERCISES: CPT

## 2025-01-09 ASSESSMENT — PAIN SCALES - GENERAL: PAINLEVEL_OUTOF10: 0

## 2025-01-09 NOTE — PROGRESS NOTES
Best SUKHI Jose  : 1975  [place]  Secondary:  [facility] [place] [address]  Phone: [phone]  Fax: [phone] Plan Frequency: Two times a week for 90 days    Plan of Care/Certification Expiration Date: 25             Plan of Care/Certification Expiration Date:  Plan of Care/Certification Expiration Date: 25    Frequency/Duration:   Plan Frequency: Two times a week for 90 days      Time In/Out:   Time In: 1645  Time Out: 1717      PT Visit Info:    Progress Note Due Date: 24  Total # of Visits to Date: 60  Progress Note Counter: 3      Visit Count: 60    OUTPATIENT PHYSICAL THERAPY:   Treatment Note 2025       Episode  (PT-Right ACL repair)               Treatment Diagnosis:    Right knee pain, unspecified chronicity  Difficulty in walking, not elsewhere classified  Medical/Referring Diagnosis:    Rupture of anterior cruciate ligament of right knee, initial encounter  Acute lateral meniscus tear of right knee, initial encounter  Injury of right knee, initial encounter  Referring Physician: Alejandra Govea PA MD Orders: PT Eval and Treat   Return MD Appt: 24  Date of Onset: 3/13/24 (DOS)  Allergies: Patient has no known allergies.  Restrictions/Precautions:   Post Surgical Precautions: Per Dr. Veronica's protocol      Interventions Planned (Treatment may consist of any combination of the following):   See eval note for interventions.    Subjective Comments: Patient says he's been able to go to the gym since his last visit.    Initial Pain Level:     0/10  Post Session Pain Level:     0/10     Medications Last Reviewed: 2025    Updated Objective Findings:   Forward: 21 in R, 22 in L  PM: 33 in R, 32 in L  PL: 32 in R, 33 in L    Treatment     THERAPEUTIC EXERCISE: (30 minutes): Exercises per grid below to improve mobility, strength, and balance. Required minimal visual, verbal

## 2025-01-16 ENCOUNTER — HOSPITAL ENCOUNTER (OUTPATIENT)
Dept: PHYSICAL THERAPY | Age: 50
Setting detail: RECURRING SERIES
Discharge: HOME | End: 2025-01-19
Payer: COMMERCIAL

## 2025-01-16 PROCEDURE — 97110 THERAPEUTIC EXERCISES: CPT

## 2025-01-16 ASSESSMENT — PAIN SCALES - GENERAL: PAINLEVEL_OUTOF10: 0

## 2025-01-16 NOTE — PROGRESS NOTES
Best Garcia  : 1975  [place]  Secondary:  [facility] [place] [address]  Phone: [phone]  Fax: [phone] Plan Frequency: Two times a week for 90 days    Plan of Care/Certification Expiration Date: 25             Plan of Care/Certification Expiration Date:  Plan of Care/Certification Expiration Date: 25    Frequency/Duration:   Plan Frequency: Two times a week for 90 days      Time In/Out:   Time In: 1652  Time Out: 1731      PT Visit Info:    Progress Note Due Date: 24  Total # of Visits to Date: 61  Progress Note Counter: 4      Visit Count: 61    OUTPATIENT PHYSICAL THERAPY:   Treatment Note 2025       Episode  (PT-Right ACL repair)               Treatment Diagnosis:    Right knee pain, unspecified chronicity  Difficulty in walking, not elsewhere classified  Medical/Referring Diagnosis:    Rupture of anterior cruciate ligament of right knee, initial encounter  Acute lateral meniscus tear of right knee, initial encounter  Injury of right knee, initial encounter  Referring Physician: Alejandra Govea PA MD Orders: PT Eval and Treat   Return MD Appt: 24  Date of Onset: 3/13/24 (DOS)  Allergies: Patient has no known allergies.  Restrictions/Precautions:   Post Surgical Precautions: Per Dr. Veronica's protocol      Interventions Planned (Treatment may consist of any combination of the following):   See eval note for interventions.    Subjective Comments: Patient arrived a little late to his appointment today. He says he's not having any pain.    Initial Pain Level:     0/10  Post Session Pain Level:     0/10     Medications Last Reviewed: 2025    Updated Objective Findings:   Forward: 21 in R, 22 in L  PM: 33 in R, 32 in L  PL: 32 in R, 33 in L    Treatment     THERAPEUTIC EXERCISE: (38 minutes): Exercises per grid below to improve mobility, strength, and balance. Required

## 2025-01-23 ENCOUNTER — HOSPITAL ENCOUNTER (OUTPATIENT)
Dept: PHYSICAL THERAPY | Age: 50
Setting detail: RECURRING SERIES
Discharge: HOME | End: 2025-01-26
Payer: COMMERCIAL

## 2025-01-23 PROCEDURE — 97110 THERAPEUTIC EXERCISES: CPT

## 2025-01-23 ASSESSMENT — PAIN SCALES - GENERAL: PAINLEVEL_OUTOF10: 0

## 2025-01-23 NOTE — PROGRESS NOTES
Best SUKHI Jose  : 1975  [place]  Secondary:  [facility] [place] [address]  Phone: [phone]  Fax: [phone] Plan Frequency: Two times a week for 90 days    Plan of Care/Certification Expiration Date: 25             Plan of Care/Certification Expiration Date:  Plan of Care/Certification Expiration Date: 25    Frequency/Duration:   Plan Frequency: Two times a week for 90 days      Time In/Out:   Time In: 1649  Time Out: 1732      PT Visit Info:    Progress Note Due Date: 24  Total # of Visits to Date: 62  Progress Note Counter: 5      Visit Count: 62    OUTPATIENT PHYSICAL THERAPY:   Treatment Note 2025       Episode  (PT-Right ACL repair)               Treatment Diagnosis:    Right knee pain, unspecified chronicity  Difficulty in walking, not elsewhere classified  Medical/Referring Diagnosis:    Rupture of anterior cruciate ligament of right knee, initial encounter  Acute lateral meniscus tear of right knee, initial encounter  Injury of right knee, initial encounter  Referring Physician: Alejandra Govea PA MD Orders: PT Eval and Treat   Return MD Appt: 24  Date of Onset: 3/13/24 (DOS)  Allergies: Patient has no known allergies.  Restrictions/Precautions:   Post Surgical Precautions: Per Dr. Veronica's protocol      Interventions Planned (Treatment may consist of any combination of the following):   See eval note for interventions.    Subjective Comments: Patient reports he's feeling good this afternoon.    Initial Pain Level:     0/10  Post Session Pain Level:     0/10     Medications Last Reviewed: 2025    Updated Objective Findings:   Forward: 21 in R, 22 in L  PM: 33 in R, 32 in L  PL: 32 in R, 33 in L    Treatment     THERAPEUTIC EXERCISE: (43 minutes): Exercises per grid below to improve mobility, strength, and balance. Required minimal visual, verbal and tactile cues

## 2025-01-30 ENCOUNTER — HOSPITAL ENCOUNTER (OUTPATIENT)
Dept: PHYSICAL THERAPY | Age: 50
Setting detail: RECURRING SERIES
End: 2025-01-30
Payer: COMMERCIAL

## 2025-01-30 PROCEDURE — 97110 THERAPEUTIC EXERCISES: CPT

## 2025-01-30 ASSESSMENT — PAIN SCALES - GENERAL: PAINLEVEL_OUTOF10: 0

## 2025-01-30 ASSESSMENT — PAIN DESCRIPTION - LOCATION: LOCATION: KNEE

## 2025-01-30 NOTE — PROGRESS NOTES
Best SUKHI Jose  : 1975  [place]  Secondary:  [facility] [place] [place] [address]  Phone: [phone]  Fax: [phone] Plan Frequency: Two times a week for 90 days    Plan of Care/Certification Expiration Date: 25             Plan of Care/Certification Expiration Date:  Plan of Care/Certification Expiration Date: 25    Frequency/Duration:   Plan Frequency: Two times a week for 90 days      Time In/Out:   Time In: 1630  Time Out: 1710      PT Visit Info:    Progress Note Due Date: 24  Total # of Visits to Date: 63  Progress Note Counter: 6      Visit Count: 63    OUTPATIENT PHYSICAL THERAPY:   Treatment Note 2025       Episode  (PT-Right ACL repair)               Treatment Diagnosis:    Right knee pain, unspecified chronicity  Difficulty in walking, not elsewhere classified  Medical/Referring Diagnosis:    Rupture of anterior cruciate ligament of right knee, initial encounter  Acute lateral meniscus tear of right knee, initial encounter  Injury of right knee, initial encounter  Referring Physician: Alejandra Govea PA MD Orders: PT Eval and Treat   Return MD Appt: 24  Date of Onset: 3/13/24 (DOS)  Allergies: Patient has no known allergies.  Restrictions/Precautions:   Post Surgical Precautions: Per Dr. Veronica's protocol      Interventions Planned (Treatment may consist of any combination of the following):   See eval note for interventions.    Subjective Comments: Patient reports he's not having any pain today.    Initial Pain Level:   Knee 0/10  Post Session Pain Level:   Knee 1/10     Medications Last Reviewed: 2025    Updated Objective Findings:   Forward: 21 in R, 22 in L  PM: 33 in R, 32 in L  PL: 32 in R, 33 in L    Treatment     THERAPEUTIC EXERCISE: (40 minutes): Exercises per grid below to improve mobility, strength, and balance. Required minimal visual, verbal and tactile

## 2025-02-06 ENCOUNTER — HOSPITAL ENCOUNTER (OUTPATIENT)
Dept: PHYSICAL THERAPY | Age: 50
Setting detail: RECURRING SERIES
Discharge: HOME | End: 2025-02-09
Payer: COMMERCIAL

## 2025-02-06 PROCEDURE — 97110 THERAPEUTIC EXERCISES: CPT

## 2025-02-06 ASSESSMENT — PAIN DESCRIPTION - LOCATION: LOCATION: KNEE

## 2025-02-06 ASSESSMENT — PAIN SCALES - GENERAL: PAINLEVEL_OUTOF10: 0

## 2025-02-06 NOTE — PROGRESS NOTES
Best SUKHI Jose  : 1975  [place]  Secondary:  [facility] [place] [address]  Phone: [phone]  Fax: [phone] Plan Frequency: Two times a week for 90 days    Plan of Care/Certification Expiration Date: 25             Plan of Care/Certification Expiration Date:  Plan of Care/Certification Expiration Date: 25    Frequency/Duration:   Plan Frequency: Two times a week for 90 days      Time In/Out:   Time In: 1645  Time Out: 1724      PT Visit Info:    Progress Note Due Date: 24  Total # of Visits to Date: 64  Progress Note Counter: 7      Visit Count: 64    OUTPATIENT PHYSICAL THERAPY:   Treatment Note 2025       Episode  (PT-Right ACL repair)               Treatment Diagnosis:    Right knee pain, unspecified chronicity  Difficulty in walking, not elsewhere classified  Medical/Referring Diagnosis:    Rupture of anterior cruciate ligament of right knee, initial encounter  Acute lateral meniscus tear of right knee, initial encounter  Injury of right knee, initial encounter  Referring Physician: Alejandra Govea PA MD Orders: PT Eval and Treat   Return MD Appt: 24  Date of Onset: 3/13/24 (DOS)  Allergies: Patient has no known allergies.  Restrictions/Precautions:   Post Surgical Precautions: Per Dr. Veronica's protocol      Interventions Planned (Treatment may consist of any combination of the following):   See eval note for interventions.    Subjective Comments: Patient reports he's not having any knee pain. He says he's having a busy week this week.    Initial Pain Level:   Knee 0/10  Post Session Pain Level:   Knee 0/10     Medications Last Reviewed: 2025    Updated Objective Findings:   Forward: 21 in R, 22 in L  PM: 33 in R, 32 in L  PL: 32 in R, 33 in L    Treatment     THERAPEUTIC EXERCISE: (39 minutes): Exercises per grid below to improve mobility, strength, and balance.

## 2025-02-13 ENCOUNTER — HOSPITAL ENCOUNTER (OUTPATIENT)
Dept: PHYSICAL THERAPY | Age: 50
Setting detail: RECURRING SERIES
Discharge: HOME | End: 2025-02-16
Payer: COMMERCIAL

## 2025-02-13 PROCEDURE — 97110 THERAPEUTIC EXERCISES: CPT

## 2025-02-13 ASSESSMENT — PAIN SCALES - GENERAL: PAINLEVEL_OUTOF10: 0

## 2025-02-13 ASSESSMENT — PAIN DESCRIPTION - LOCATION: LOCATION: KNEE

## 2025-02-21 ENCOUNTER — HOSPITAL ENCOUNTER (OUTPATIENT)
Dept: PHYSICAL THERAPY | Age: 50
Setting detail: RECURRING SERIES
Discharge: HOME | End: 2025-02-24
Payer: COMMERCIAL

## 2025-02-21 PROCEDURE — 97110 THERAPEUTIC EXERCISES: CPT

## 2025-02-21 ASSESSMENT — PAIN SCALES - GENERAL: PAINLEVEL_OUTOF10: 0

## 2025-02-21 NOTE — PROGRESS NOTES
Patient Name: Best Garcia  Date: 2/21/2025  Surgery Date: 3/13/24  Procedure: R knee arthroscopy w/ partial lateral menis/ACL recon  Month: 11  Clinic: [facility]  Lead Therapist: Kp Fishman, BREANA  Sport:  ACL-R Return to Sport Guidelines       Phase     Return to Running [x]     Return to Participation [x]     Return to Sport []     Discharge []       *Completed [x]                                   ROM Right Left Diff +/- Passed Goal   Extension -2 -3 1 [x] Within 2 degrees of contralateral side   Flexion 133 138 10 [x] Within 5 degrees of contralateral side       LSI Right     (lbs) Left  (lbs) % P/F   Quad (Q) 116 90 128.8% PASS   Hamstring (H) 61 60 101.6% PASS   Q-H ratio 1.9 1.5  PASS   Goal RTP: 85% Discharge: 95% or higher    ACL-RSI Score: 76 (11/5/24)   Goal RTP:70%; Discharge 80%    Functional Testing   Right   Left PASS/  FAIL   Goal   Y balance A: 21  PM: 33  PL: 32 A: 22  PM: 32  PL: 33   PASS   Equal to non op knee to <4 cm   SL Squat   X   X   PASS 60-30 degrees  No knee valgus  No excessive forward trunk lean    Drop Jump   (DL to SL) X X PASS Looking for Dynamic Knee Valgus, trunk hip and knee strategy with landing   [employer] Sport Cord        []   [place] Sport cord Test: >46 passing   Return to Participation Time frame: 5-7 months >80% of non op LE;   Discharge: 8-12 months >90% of non op LE    Hop Testing:  - >95% symmetry for single-leg and triple hop between sides    Assessment: Patient has made significant gains in strength and mobility through skilled therapy services. He demonstrates improved activity tolerance through dynamic activities, including running and hopping. He verbalizes ability to perform functional and recreational tasks without difficulty. At this time, he indicates readiness for returning to participation in his normal activities without restriction.

## 2025-02-21 NOTE — PROGRESS NOTES
Best Garcia  : 1975  [place]  Secondary:  [facility] [place] [place] [address]  Phone: [phone]  Fax: [phone] Plan Frequency: Two times a week for 90 days    Plan of Care/Certification Expiration Date: 25             Plan of Care/Certification Expiration Date:  Plan of Care/Certification Expiration Date: 25    Frequency/Duration:   Plan Frequency: Two times a week for 90 days      Time In/Out:   Time In: 1450  Time Out: 1508      PT Visit Info:    Progress Note Due Date: 24  Total # of Visits to Date: 66  Progress Note Counter: 9      Visit Count: 66    OUTPATIENT PHYSICAL THERAPY:   Treatment Note 2025       Episode  (PT-Right ACL repair)               Treatment Diagnosis:    Right knee pain, unspecified chronicity  Difficulty in walking, not elsewhere classified  Medical/Referring Diagnosis:    Rupture of anterior cruciate ligament of right knee, initial encounter  Acute lateral meniscus tear of right knee, initial encounter  Injury of right knee, initial encounter  Referring Physician: Alejandra Govea PA MD Orders: PT Eval and Treat   Return MD Appt: 24  Date of Onset: 3/13/24 (DOS)  Allergies: Patient has no known allergies.  Restrictions/Precautions:   Post Surgical Precautions: Per Dr. Veronica's protocol      Interventions Planned (Treatment may consist of any combination of the following):   See eval note for interventions.    Subjective Comments: Patient reports no new changes.    Initial Pain Level:     0/10  Post Session Pain Level:     0/10     Medications Last Reviewed: 2025    Updated Objective Findings:   Forward: 21 in R, 22 in L  PM: 33 in R, 32 in L  PL: 32 in R, 33 in L    Treatment     THERAPEUTIC EXERCISE: (45 minutes): Exercises per grid below to improve mobility, strength, and balance. Required minimal visual, verbal and tactile cues to promote proper

## 2025-02-24 ENCOUNTER — OFFICE VISIT (OUTPATIENT)
Dept: ORTHOPEDIC SURGERY | Age: 50
End: 2025-02-24
Payer: COMMERCIAL

## 2025-02-24 DIAGNOSIS — Z09 S/P ORTHOPEDIC SURGERY, FOLLOW-UP EXAM: Primary | ICD-10-CM

## 2025-02-24 PROCEDURE — 99213 OFFICE O/P EST LOW 20 MIN: CPT | Performed by: ORTHOPAEDIC SURGERY

## 2025-02-24 NOTE — PROGRESS NOTES
Name: Best Garcia  YOB: 1975  Gender: male  MRN: [id number]      CC: Knee Pain (R)       HPI: Best Garcia is a 50 y.o. male who returns for follow up on his right knee status post allograft ACL reconstruction in March 2024. He is doing really well.  He has passed all of his strength and functional testing.      Physical Examination:  General: no acute distress  Lungs: breathing easily  CV: regular rhythm by pulse  Right Knee: Full passive and active range of motion minimal translation on his Lachman's excellent quad strength        Assessment:     ICD-10-CM    1. S/P orthopedic surgery, follow-up exam  Z09           Plan:   He has recovered very well he has excellent quad strength which is stronger than his contralateral side and has passed all of his functional testing.  He can progress his activities as tolerated and follow-up with me as needed          Kash Veronica MD, FAAOS  Orthopaedics and Sports Medicine

## (undated) DEVICE — FOAM BUMP ROUND LARGE: Brand: MEDLINE INDUSTRIES, INC.

## (undated) DEVICE — COVER,TABLE,44X76,STERILE: Brand: MEDLINE

## (undated) DEVICE — GOWN,PREVENTION PLUS,XLN/XL,ST,24/CS: Brand: MEDLINE

## (undated) DEVICE — SUTURE ABSORBABLE BRAIDED 0 CT-1 8X27 IN UD VICRYL JJ41G

## (undated) DEVICE — BLADE SHV L13CM DIA5.5MM BNE CUT AGG DEB COOLCUT

## (undated) DEVICE — KNEE ARTHROSCOPY DR KOCH: Brand: MEDLINE INDUSTRIES, INC.

## (undated) DEVICE — SPONGE GZ W4XL4IN COT 12 PLY TYP VII WVN C FLD DSGN STERILE

## (undated) DEVICE — 4-PORT MANIFOLD: Brand: NEPTUNE 2

## (undated) DEVICE — INTENDED FOR TISSUE SEPARATION, AND OTHER PROCEDURES THAT REQUIRE A SHARP SURGICAL BLADE TO PUNCTURE OR CUT.: Brand: BARD-PARKER ® STAINLESS STEEL BLADES

## (undated) DEVICE — PENCIL ES L3M BTTN SWCH HOLSTER W/ BLDE ELECTRD EDGE

## (undated) DEVICE — PRECISION THIN (9.0 X 0.38 X 31.0MM)

## (undated) DEVICE — SYRINGE IRRIG 60ML SFT PLIABLE BLB EZ TO GRP 1 HND USE W/

## (undated) DEVICE — SUTURE FIBERWIRE FIBERSTICK SZ 2-0 L50/12IN NONABSORBABLE AR7209

## (undated) DEVICE — STRIP,CLOSURE,WOUND,MEDI-STRIP,1/2X4: Brand: MEDLINE

## (undated) DEVICE — YANKAUER,BULB TIP,W/O VENT,RIGID,STERILE: Brand: MEDLINE

## (undated) DEVICE — TUBING, SUCTION, 1/4" X 10', STRAIGHT: Brand: MEDLINE

## (undated) DEVICE — STOCKINETTE,IMPERVIOUS,12X48,STERILE: Brand: MEDLINE

## (undated) DEVICE — ELECTRODE PT RET AD L9FT HI MOIST COND ADH HYDRGEL CORDED

## (undated) DEVICE — PAD,ABDOMINAL,5"X9",ST,LF,25/BX: Brand: MEDLINE INDUSTRIES, INC.

## (undated) DEVICE — PROBE ABLAT 90DEG ASPIR MULTIPORT BPLR RF 1 PC ELECTRD ERGO

## (undated) DEVICE — SHEET,DRAPE,53X77,STERILE: Brand: MEDLINE

## (undated) DEVICE — SUTURE TIGERSTICK TIGERWIRE SZ 2-0 L50IN NONABSORBABLE AR7209T

## (undated) DEVICE — TUBING PMP L16FT MAIN DISP FOR AR-6400 AR-6475

## (undated) DEVICE — SOLUTION IRRIG 3000ML 0.9% SOD CHL USP UROMATIC PLAS CONT

## (undated) DEVICE — BANDAGE COMPR W6INXL10YD ST M E WHITE/BEIGE

## (undated) DEVICE — ZIMMER® STERILE DISPOSABLE TOURNIQUET CUFF WITH PLC, DUAL PORT, SINGLE BLADDER, 30 IN. (76 CM)

## (undated) DEVICE — STRIPPER TENDON QUADPRO HARVESTER 10MM

## (undated) DEVICE — 2.4 MM FLEXIBLE PASSING PINS,                                    STERILE 5 PER PACKAGE: Brand: ENDOBUTTON

## (undated) DEVICE — CARDINAL HEALTH FLEXIBLE LIGHT HANDLE COVER: Brand: CARDINAL HEALTH

## (undated) DEVICE — 3M™ IOBAN™ 2 ANTIMICROBIAL INCISE DRAPE 6650EZ: Brand: IOBAN™ 2

## (undated) DEVICE — SUTURE MCRYL SZ 2-0 L27IN ABSRB UD CP-1 1 L36MM 1/2 CIR REV Y266H

## (undated) DEVICE — 3M™ STERI-DRAPE™ U-DRAPE 1015: Brand: STERI-DRAPE™

## (undated) DEVICE — BLADE SHV L13CM DIA4MM CVD EXCALIBUR AGG COOLCUT